# Patient Record
Sex: MALE | Race: WHITE | NOT HISPANIC OR LATINO | URBAN - METROPOLITAN AREA
[De-identification: names, ages, dates, MRNs, and addresses within clinical notes are randomized per-mention and may not be internally consistent; named-entity substitution may affect disease eponyms.]

---

## 2017-01-01 ENCOUNTER — EMERGENCY (EMERGENCY)
Facility: HOSPITAL | Age: 27
LOS: 1 days | Discharge: HOME | End: 2017-01-01
Attending: EMERGENCY MEDICINE | Admitting: EMERGENCY MEDICINE
Payer: COMMERCIAL

## 2017-01-01 VITALS
TEMPERATURE: 98 F | RESPIRATION RATE: 18 BRPM | DIASTOLIC BLOOD PRESSURE: 96 MMHG | SYSTOLIC BLOOD PRESSURE: 151 MMHG | OXYGEN SATURATION: 100 % | HEART RATE: 82 BPM

## 2017-01-01 LAB
ALBUMIN SERPL ELPH-MCNC: 4.9 G/DL — SIGNIFICANT CHANGE UP (ref 3.3–5)
ALP SERPL-CCNC: 49 U/L — SIGNIFICANT CHANGE UP (ref 40–120)
ALT FLD-CCNC: 17 U/L — SIGNIFICANT CHANGE UP (ref 4–41)
AST SERPL-CCNC: 24 U/L — SIGNIFICANT CHANGE UP (ref 4–40)
BASOPHILS # BLD AUTO: 0.04 K/UL — SIGNIFICANT CHANGE UP (ref 0–0.2)
BASOPHILS NFR BLD AUTO: 0.5 % — SIGNIFICANT CHANGE UP (ref 0–2)
BILIRUB SERPL-MCNC: 0.6 MG/DL — SIGNIFICANT CHANGE UP (ref 0.2–1.2)
BUN SERPL-MCNC: 8 MG/DL — SIGNIFICANT CHANGE UP (ref 7–23)
CALCIUM SERPL-MCNC: 9.6 MG/DL — SIGNIFICANT CHANGE UP (ref 8.4–10.5)
CHLORIDE SERPL-SCNC: 103 MMOL/L — SIGNIFICANT CHANGE UP (ref 98–107)
CO2 SERPL-SCNC: 24 MMOL/L — SIGNIFICANT CHANGE UP (ref 22–31)
CREAT SERPL-MCNC: 0.99 MG/DL — SIGNIFICANT CHANGE UP (ref 0.5–1.3)
CRP SERPL-MCNC: 0.3 MG/L — SIGNIFICANT CHANGE UP (ref 0.3–5)
EOSINOPHIL # BLD AUTO: 0.26 K/UL — SIGNIFICANT CHANGE UP (ref 0–0.5)
EOSINOPHIL NFR BLD AUTO: 3.3 % — SIGNIFICANT CHANGE UP (ref 0–6)
ERYTHROCYTE [SEDIMENTATION RATE] IN BLOOD: 2 MM/HR — SIGNIFICANT CHANGE UP (ref 1–15)
GLUCOSE SERPL-MCNC: 101 MG/DL — HIGH (ref 70–99)
HCT VFR BLD CALC: 45.3 % — SIGNIFICANT CHANGE UP (ref 39–50)
HGB BLD-MCNC: 15.5 G/DL — SIGNIFICANT CHANGE UP (ref 13–17)
IMM GRANULOCYTES NFR BLD AUTO: 0.3 % — SIGNIFICANT CHANGE UP (ref 0–1.5)
INR BLD: 1.02 — SIGNIFICANT CHANGE UP (ref 0.87–1.18)
LYMPHOCYTES # BLD AUTO: 1.49 K/UL — SIGNIFICANT CHANGE UP (ref 1–3.3)
LYMPHOCYTES # BLD AUTO: 19 % — SIGNIFICANT CHANGE UP (ref 13–44)
MCHC RBC-ENTMCNC: 32.2 PG — SIGNIFICANT CHANGE UP (ref 27–34)
MCHC RBC-ENTMCNC: 34.2 % — SIGNIFICANT CHANGE UP (ref 32–36)
MCV RBC AUTO: 94.2 FL — SIGNIFICANT CHANGE UP (ref 80–100)
MONOCYTES # BLD AUTO: 0.39 K/UL — SIGNIFICANT CHANGE UP (ref 0–0.9)
MONOCYTES NFR BLD AUTO: 5 % — SIGNIFICANT CHANGE UP (ref 2–14)
NEUTROPHILS # BLD AUTO: 5.66 K/UL — SIGNIFICANT CHANGE UP (ref 1.8–7.4)
NEUTROPHILS NFR BLD AUTO: 71.9 % — SIGNIFICANT CHANGE UP (ref 43–77)
PLATELET # BLD AUTO: 153 K/UL — SIGNIFICANT CHANGE UP (ref 150–400)
PMV BLD: 9.3 FL — SIGNIFICANT CHANGE UP (ref 7–13)
POTASSIUM SERPL-MCNC: 4.2 MMOL/L — SIGNIFICANT CHANGE UP (ref 3.5–5.3)
POTASSIUM SERPL-SCNC: 4.2 MMOL/L — SIGNIFICANT CHANGE UP (ref 3.5–5.3)
PROT SERPL-MCNC: 7.4 G/DL — SIGNIFICANT CHANGE UP (ref 6–8.3)
PROTHROM AB SERPL-ACNC: 11.6 SEC — SIGNIFICANT CHANGE UP (ref 10–13.1)
RBC # BLD: 4.81 M/UL — SIGNIFICANT CHANGE UP (ref 4.2–5.8)
RBC # FLD: 11.7 % — SIGNIFICANT CHANGE UP (ref 10.3–14.5)
SODIUM SERPL-SCNC: 144 MMOL/L — SIGNIFICANT CHANGE UP (ref 135–145)
WBC # BLD: 7.86 K/UL — SIGNIFICANT CHANGE UP (ref 3.8–10.5)
WBC # FLD AUTO: 7.86 K/UL — SIGNIFICANT CHANGE UP (ref 3.8–10.5)

## 2017-01-01 PROCEDURE — 72142 MRI NECK SPINE W/DYE: CPT | Mod: 26

## 2017-01-01 PROCEDURE — 99219: CPT

## 2017-01-01 RX ORDER — KETOROLAC TROMETHAMINE 30 MG/ML
30 SYRINGE (ML) INJECTION ONCE
Qty: 0 | Refills: 0 | Status: DISCONTINUED | OUTPATIENT
Start: 2017-01-01 | End: 2017-01-01

## 2017-01-01 RX ORDER — KETOROLAC TROMETHAMINE 30 MG/ML
30 SYRINGE (ML) INJECTION EVERY 6 HOURS
Qty: 0 | Refills: 0 | Status: COMPLETED | OUTPATIENT
Start: 2017-01-01 | End: 2017-01-02

## 2017-01-01 RX ADMIN — Medication 30 MILLIGRAM(S): at 17:00

## 2017-01-01 RX ADMIN — Medication 30 MILLIGRAM(S): at 16:40

## 2017-01-01 NOTE — ED ADULT TRIAGE NOTE - CHIEF COMPLAINT QUOTE
States he has had neck pain for 2 years. Had MRI done in 2014 and showed "slight disc herniation". Now co headaches and neck pain x 2 weeks. Co light sensitivity. + nuchal rigidity. Denies fevers. Mask applied in triage.

## 2017-01-01 NOTE — ED PROVIDER NOTE - PMH
Allergic Asthma    Cyclical vomiting syndrome    Insomnia    Intractable nausea and vomiting    Marijuana smoker    Neck pain

## 2017-01-01 NOTE — ED CDU PROVIDER NOTE - PLAN OF CARE
Take medications as prescribed. Follow with Spine Surgery. Return if neurologic motor symptoms such as weakness in arm or leg, imbalance/uncoordination, or incontinence, or if pain intolerable despite oral medications.

## 2017-01-01 NOTE — ED CDU PROVIDER NOTE - CONSTITUTIONAL, MLM
normal... Well appearing, well nourished, awake, alert, oriented to person, place, time/situation and in no apparent distress.  Pt. is verbalizing in full, clear sentences.

## 2017-01-01 NOTE — ED PROVIDER NOTE - OBJECTIVE STATEMENT
26y M with PMH of chronic neck pain presents to ED for neck pain x 2 year. Pt had MRI in 2014 showing mild disc herniation to C4. Today had headache and neck pain described as stabbing. Now notes migraines and trouble sleeping.  No trauma. Pt was weightlifter and played football. No fever nor change in mental status. Went to Spine surgeon, who referred to Neurology. Neurologist rx CT scan, but insurance denies. Pt now notes he is holds his neck differently and is cracking neck. Now notes RT shoulder pain. Earlier this year had 2 wks of hemoptysis, CXR negative, had pulmonologist, pt wnl. PMD rx muscle relaxants to no relief. Marijuana to no relief.

## 2017-01-01 NOTE — ED PROVIDER NOTE - NS ED MD SCRIBE ATTENDING SCRIBE SECTIONS
REVIEW OF SYSTEMS/DISPOSITION/HISTORY OF PRESENT ILLNESS/VITAL SIGNS( Pullset)/PAST MEDICAL/SURGICAL/SOCIAL HISTORY/PHYSICAL EXAM

## 2017-01-01 NOTE — ED PROVIDER NOTE - ATTENDING CONTRIBUTION TO CARE
I performed the initial face to face bedside interview with this patient regarding history of present illness, review of symptoms and past medical, social and family history.  I completed an independent physical examination.  I was the initial provider who evaluated this patient.  The history, review of symptoms and examination was documented by the scribe in my presence and I attest to the accuracy of the documentation.  I have signed out the follow up of any pending tests (i.e. labs, radiological studies) to the PA.  I have discussed the patient’s plan of care and disposition with the PA.

## 2017-01-01 NOTE — ED CDU PROVIDER NOTE - OBJECTIVE STATEMENT
26y M with PMH of chronic neck pain presents to ED for neck pain x 2 year. Pt had MRI in 2014 showing mild disc herniation to C4. Today had headache and neck pain described as stabbing. Now notes migraines and trouble sleeping.  No trauma. Pt was weightlifter and played football. No fever nor change in mental status. Went to Spine surgeon, who referred to Neurology. Neurologist rx CT scan, but insurance denies. Pt now notes he is holds his neck differently and is cracking neck. Now notes RT shoulder pain. Earlier this year had 2 wks of hemoptysis, CXR negative, had pulmonologist, pt wnl. PMD rx muscle relaxants to no relief. Marijuana to no relief.    CDU PAVEL Smith Note-----  ED Provider Note reviewed per above; pt is a 27 yo male with PMH of chronic neck pain, marijuana use, and past hx/o cyclic vomiting syndrome, who presented to the ED as noted above.  Pt. was evaluated in the ED and sent to CDU for MRI per orders, analgesia prn, observation and reassessment.  On CDU arrival, pt was still having neck pain; Percocet given.  Pt. c/o intermittent morning bilateral arm numbness that has been chronically occurring; states usually feels these symptoms when he first awakens.  Pt. has been to an orthopedic spine surgeon who gave very brief evaluation per pt and referred pt to neurologist (pt saw neurologist, was referred for CT scan, but pt states he had issues with his insurance approving this test).  Pt. states he has an upcoming appointment with a rheumatologist in another 2 weeks as well.  No fevers / new pain/discomfort.  No reports of focal weakness.  CDU plan discussed with pt who verbalizes agreement with plan.

## 2017-01-01 NOTE — ED CDU PROVIDER NOTE - MUSCULOSKELETAL MINIMAL EXAM
normal range of motion/neck supple/No muscle spasm or crepitus or soft tissue mass palpated.  No objectively reproducible TTP of neck posteriorly or upper back.  Strength in UE is 5/5 both proximally and distally; +NVI.  UE symmetrical and otherwise WNL bilaterally.  No limited ROM./motor intact/atraumatic

## 2017-01-01 NOTE — ED CDU PROVIDER NOTE - PROGRESS NOTE DETAILS
CDU PAVEL Smith Addendum----  Pt. resting comfortably in interim; no issues to date.  MRI is resulted; shows degenerative changes but no acute pathology.  Test results explained in detail (along with lab results); likely for d/c this am.  Pt. had stated he was previously on "muscle relaxants" but he was taking higher doses than prescribed with no relief, so pt stopped taking.  Pt. states percocet given helped his pain and brought pain to a 5/10 "tolerable" level; pt denies needing any additional analgesia at this time.  Pt. stable; will continue to monitor / reassess. CDU PA Luis Addendum----  Pt. resting comfortably in interim; no issues to date; will continue to monitor / reassess.  Pt. will be signed out to CDU day PA and attending at 0700 hrs. Merlene (Physician) CDU ATTENDING D/C NOTE: 26 M, PMH chronic neck pain (MRI 2014 w/ broad disc herniation to C4), not traumatic, p/w worsening headache and neck pain. Notes migraines and trouble sleeping. No fever nor change in mental status. Went to Spine surgeon, who referred to Neurology. Neurologist, who asked for a cervical CT scan, but insurance denied. Pt now notes he is holds his neck differently and is cracking is neck. Now notes RT shoulder pain. PMD rx muscle relaxants to no relief. Marijuana no relief. Earlier this year, had hemoptysis. PE notable for AFVSS, NAD, appears in mild pain. No point tenderness along C-spine. No motor deficits. Small, mobile L neck anterior chain LN. Patient admitted to CDU for MRI to eval for cervical osteomyelitis. MRI confirmed disc herniations, no e/o osteo or cord compression. Pain improved with Percocet. Discharge home w/ referral to Spine Surgery and Percocet. Merlene: PA attestation for 1/2/17. I performed a face-to-face evaluation of the patient and performed a history and physical examination. I agree with the history and physical examination.

## 2017-01-01 NOTE — ED PROVIDER NOTE - PROGRESS NOTE DETAILS
PAVEL Ryan- spoke with MRI tech, unable to do MRI tonight- closes at 11, 1 pediatric emergency and 2 other MRI's scheduled. Pt still in pain.  Will dispo for CDU for continued pain management and MRI c-spine in the am.

## 2017-01-01 NOTE — ED CDU PROVIDER NOTE - ATTENDING CONTRIBUTION TO CARE
I performed a face-to-face evaluation of the patient and performed a history and physical examination. I agree with the history and physical examination.    26 M, PMH chronic neck pain (MRI 2014 w/ broad disc herniation to C4), not traumatic, p/w worsening headache and neck pain. Notes migraines and trouble sleeping. No fever nor change in mental status. Went to Spine surgeon, who referred to Neurology. Neurologist, who asked for a cervical CT scan, but insurance denied. Pt now notes he is holds his neck differently and is cracking is neck. Now notes RT shoulder pain. PMD rx muscle relaxants to no relief. Marijuana no relief. Earlier this year, had hemoptysis. PE notable for AFVSS, NAD, appears in mild pain. No point tenderness along C-spine. No motor deficits. Small, mobile L neck anterior chain LN. Patient admitted to CDU for MRI to eval for cervical osteomyelitis.

## 2017-01-02 VITALS
TEMPERATURE: 98 F | RESPIRATION RATE: 18 BRPM | SYSTOLIC BLOOD PRESSURE: 119 MMHG | DIASTOLIC BLOOD PRESSURE: 67 MMHG | OXYGEN SATURATION: 100 % | HEART RATE: 52 BPM

## 2017-01-02 LAB — HBA1C BLD-MCNC: 5.5 % — SIGNIFICANT CHANGE UP (ref 4–5.6)

## 2017-01-02 PROCEDURE — 99217: CPT

## 2017-01-02 RX ORDER — OXYCODONE AND ACETAMINOPHEN 5; 325 MG/1; MG/1
1 TABLET ORAL
Qty: 16 | Refills: 0
Start: 2017-01-02 | End: 2017-01-06

## 2017-01-02 RX ADMIN — Medication 30 MILLIGRAM(S): at 01:17

## 2017-01-02 RX ADMIN — Medication 30 MILLIGRAM(S): at 01:30

## 2017-01-02 RX ADMIN — Medication 30 MILLIGRAM(S): at 07:15

## 2017-01-09 ENCOUNTER — APPOINTMENT (OUTPATIENT)
Dept: PHYSICAL MEDICINE AND REHAB | Facility: CLINIC | Age: 27
End: 2017-01-09

## 2017-01-09 VITALS
SYSTOLIC BLOOD PRESSURE: 128 MMHG | BODY MASS INDEX: 20.32 KG/M2 | WEIGHT: 150 LBS | DIASTOLIC BLOOD PRESSURE: 79 MMHG | HEIGHT: 72 IN | OXYGEN SATURATION: 97 % | HEART RATE: 77 BPM

## 2017-01-09 DIAGNOSIS — M54.12 RADICULOPATHY, CERVICAL REGION: ICD-10-CM

## 2017-01-09 DIAGNOSIS — M54.2 CERVICALGIA: ICD-10-CM

## 2017-01-09 DIAGNOSIS — R51 HEADACHE: ICD-10-CM

## 2017-01-09 RX ORDER — NAPROXEN 500 MG/1
TABLET ORAL
Refills: 0 | Status: ACTIVE | COMMUNITY

## 2017-01-09 RX ORDER — OXYCODONE HYDROCHLORIDE AND ACETAMINOPHEN 5; 325 MG/1; MG/1
5-325 TABLET ORAL
Refills: 0 | Status: ACTIVE | COMMUNITY

## 2017-01-23 RX ORDER — OXYCODONE AND ACETAMINOPHEN 5; 325 MG/1; MG/1
5-325 TABLET ORAL
Qty: 60 | Refills: 0 | Status: ACTIVE | COMMUNITY
Start: 2017-01-23 | End: 1900-01-01

## 2018-02-13 ENCOUNTER — TRANSCRIPTION ENCOUNTER (OUTPATIENT)
Age: 28
End: 2018-02-13

## 2019-01-11 ENCOUNTER — TRANSCRIPTION ENCOUNTER (OUTPATIENT)
Age: 29
End: 2019-01-11

## 2019-03-30 ENCOUNTER — TRANSCRIPTION ENCOUNTER (OUTPATIENT)
Age: 29
End: 2019-03-30

## 2021-06-30 NOTE — ED CDU PROVIDER NOTE - HEAD, MLM
MD notified of rhythm change. EKG preformed to verify pt in afib with bundle branch block. Will continue to monitor. Head is atraumatic. Head shape is symmetrical.
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
## 2024-02-21 ENCOUNTER — INPATIENT (INPATIENT)
Facility: HOSPITAL | Age: 34
LOS: 1 days | Discharge: HOME | DRG: 392 | End: 2024-02-23
Attending: INTERNAL MEDICINE | Admitting: INTERNAL MEDICINE
Payer: MEDICAID

## 2024-02-21 VITALS
RESPIRATION RATE: 16 BRPM | HEART RATE: 87 BPM | WEIGHT: 149.91 LBS | TEMPERATURE: 98 F | HEIGHT: 71 IN | DIASTOLIC BLOOD PRESSURE: 90 MMHG | SYSTOLIC BLOOD PRESSURE: 155 MMHG | OXYGEN SATURATION: 98 %

## 2024-02-21 LAB
ALBUMIN SERPL ELPH-MCNC: 5.4 G/DL — HIGH (ref 3.3–5)
ALP SERPL-CCNC: 64 U/L — SIGNIFICANT CHANGE UP (ref 40–120)
ALT FLD-CCNC: 26 U/L — SIGNIFICANT CHANGE UP (ref 12–78)
ANION GAP SERPL CALC-SCNC: 7 MMOL/L — SIGNIFICANT CHANGE UP (ref 5–17)
APPEARANCE UR: CLEAR — SIGNIFICANT CHANGE UP
AST SERPL-CCNC: 26 U/L — SIGNIFICANT CHANGE UP (ref 15–37)
BASOPHILS # BLD AUTO: 0.02 K/UL — SIGNIFICANT CHANGE UP (ref 0–0.2)
BASOPHILS NFR BLD AUTO: 0.1 % — SIGNIFICANT CHANGE UP (ref 0–2)
BILIRUB SERPL-MCNC: 0.9 MG/DL — SIGNIFICANT CHANGE UP (ref 0.2–1.2)
BILIRUB UR-MCNC: NEGATIVE — SIGNIFICANT CHANGE UP
BUN SERPL-MCNC: 11 MG/DL — SIGNIFICANT CHANGE UP (ref 7–23)
CALCIUM SERPL-MCNC: 10.8 MG/DL — HIGH (ref 8.5–10.1)
CHLORIDE SERPL-SCNC: 113 MMOL/L — HIGH (ref 96–108)
CO2 SERPL-SCNC: 25 MMOL/L — SIGNIFICANT CHANGE UP (ref 22–31)
COLOR SPEC: YELLOW — SIGNIFICANT CHANGE UP
CREAT SERPL-MCNC: 1.2 MG/DL — SIGNIFICANT CHANGE UP (ref 0.5–1.3)
DIFF PNL FLD: ABNORMAL
EGFR: 83 ML/MIN/1.73M2 — SIGNIFICANT CHANGE UP
EOSINOPHIL # BLD AUTO: 0 K/UL — SIGNIFICANT CHANGE UP (ref 0–0.5)
EOSINOPHIL NFR BLD AUTO: 0 % — SIGNIFICANT CHANGE UP (ref 0–6)
EPI CELLS # UR: PRESENT
GLUCOSE SERPL-MCNC: 139 MG/DL — HIGH (ref 70–99)
GLUCOSE UR QL: 100 MG/DL
HCT VFR BLD CALC: 48 % — SIGNIFICANT CHANGE UP (ref 39–50)
HGB BLD-MCNC: 17.1 G/DL — HIGH (ref 13–17)
IMM GRANULOCYTES NFR BLD AUTO: 0.3 % — SIGNIFICANT CHANGE UP (ref 0–0.9)
KETONES UR-MCNC: 15 MG/DL
LEUKOCYTE ESTERASE UR-ACNC: NEGATIVE — SIGNIFICANT CHANGE UP
LIDOCAIN IGE QN: 18 U/L — SIGNIFICANT CHANGE UP (ref 13–75)
LYMPHOCYTES # BLD AUTO: 0.57 K/UL — LOW (ref 1–3.3)
LYMPHOCYTES # BLD AUTO: 4.2 % — LOW (ref 13–44)
MCHC RBC-ENTMCNC: 32.4 PG — SIGNIFICANT CHANGE UP (ref 27–34)
MCHC RBC-ENTMCNC: 35.6 GM/DL — SIGNIFICANT CHANGE UP (ref 32–36)
MCV RBC AUTO: 91.1 FL — SIGNIFICANT CHANGE UP (ref 80–100)
MONOCYTES # BLD AUTO: 0.34 K/UL — SIGNIFICANT CHANGE UP (ref 0–0.9)
MONOCYTES NFR BLD AUTO: 2.5 % — SIGNIFICANT CHANGE UP (ref 2–14)
NEUTROPHILS # BLD AUTO: 12.67 K/UL — HIGH (ref 1.8–7.4)
NEUTROPHILS NFR BLD AUTO: 92.9 % — HIGH (ref 43–77)
NITRITE UR-MCNC: NEGATIVE — SIGNIFICANT CHANGE UP
NRBC # BLD: 0 /100 WBCS — SIGNIFICANT CHANGE UP (ref 0–0)
PH UR: 8.5 (ref 5–8)
PLATELET # BLD AUTO: 268 K/UL — SIGNIFICANT CHANGE UP (ref 150–400)
POTASSIUM SERPL-MCNC: 4.6 MMOL/L — SIGNIFICANT CHANGE UP (ref 3.5–5.3)
POTASSIUM SERPL-SCNC: 4.6 MMOL/L — SIGNIFICANT CHANGE UP (ref 3.5–5.3)
PROT SERPL-MCNC: 9.4 G/DL — HIGH (ref 6–8.3)
PROT UR-MCNC: 100 MG/DL
RBC # BLD: 5.27 M/UL — SIGNIFICANT CHANGE UP (ref 4.2–5.8)
RBC # FLD: 11.8 % — SIGNIFICANT CHANGE UP (ref 10.3–14.5)
RBC CASTS # UR COMP ASSIST: 5 /HPF — HIGH (ref 0–4)
SODIUM SERPL-SCNC: 145 MMOL/L — SIGNIFICANT CHANGE UP (ref 135–145)
SP GR SPEC: 1.09 — HIGH (ref 1–1.03)
UROBILINOGEN FLD QL: 0.2 MG/DL — SIGNIFICANT CHANGE UP (ref 0.2–1)
WBC # BLD: 13.64 K/UL — HIGH (ref 3.8–10.5)
WBC # FLD AUTO: 13.64 K/UL — HIGH (ref 3.8–10.5)
WBC UR QL: 0 /HPF — SIGNIFICANT CHANGE UP (ref 0–5)

## 2024-02-21 PROCEDURE — 99285 EMERGENCY DEPT VISIT HI MDM: CPT

## 2024-02-21 PROCEDURE — 74177 CT ABD & PELVIS W/CONTRAST: CPT | Mod: 26,MA

## 2024-02-21 RX ORDER — SODIUM CHLORIDE 9 MG/ML
1000 INJECTION INTRAMUSCULAR; INTRAVENOUS; SUBCUTANEOUS ONCE
Refills: 0 | Status: COMPLETED | OUTPATIENT
Start: 2024-02-21 | End: 2024-02-21

## 2024-02-21 RX ORDER — KETOROLAC TROMETHAMINE 30 MG/ML
30 SYRINGE (ML) INJECTION ONCE
Refills: 0 | Status: DISCONTINUED | OUTPATIENT
Start: 2024-02-21 | End: 2024-02-21

## 2024-02-21 RX ORDER — ONDANSETRON 8 MG/1
1 TABLET, FILM COATED ORAL
Qty: 2 | Refills: 0
Start: 2024-02-21 | End: 2024-02-24

## 2024-02-21 RX ORDER — METOCLOPRAMIDE HCL 10 MG
10 TABLET ORAL ONCE
Refills: 0 | Status: COMPLETED | OUTPATIENT
Start: 2024-02-21 | End: 2024-02-21

## 2024-02-21 RX ORDER — ACETAMINOPHEN 500 MG
1000 TABLET ORAL ONCE
Refills: 0 | Status: COMPLETED | OUTPATIENT
Start: 2024-02-21 | End: 2024-02-21

## 2024-02-21 RX ORDER — ONDANSETRON 8 MG/1
4 TABLET, FILM COATED ORAL ONCE
Refills: 0 | Status: COMPLETED | OUTPATIENT
Start: 2024-02-21 | End: 2024-02-21

## 2024-02-21 RX ORDER — HALOPERIDOL DECANOATE 100 MG/ML
2.5 INJECTION INTRAMUSCULAR ONCE
Refills: 0 | Status: COMPLETED | OUTPATIENT
Start: 2024-02-21 | End: 2024-02-21

## 2024-02-21 RX ADMIN — Medication 30 MILLIGRAM(S): at 20:48

## 2024-02-21 RX ADMIN — Medication 400 MILLIGRAM(S): at 23:16

## 2024-02-21 RX ADMIN — HALOPERIDOL DECANOATE 2.5 MILLIGRAM(S): 100 INJECTION INTRAMUSCULAR at 23:16

## 2024-02-21 RX ADMIN — SODIUM CHLORIDE 1000 MILLILITER(S): 9 INJECTION INTRAMUSCULAR; INTRAVENOUS; SUBCUTANEOUS at 20:14

## 2024-02-21 RX ADMIN — Medication 30 MILLIGRAM(S): at 21:30

## 2024-02-21 RX ADMIN — SODIUM CHLORIDE 1000 MILLILITER(S): 9 INJECTION INTRAMUSCULAR; INTRAVENOUS; SUBCUTANEOUS at 20:49

## 2024-02-21 RX ADMIN — ONDANSETRON 4 MILLIGRAM(S): 8 TABLET, FILM COATED ORAL at 23:16

## 2024-02-21 RX ADMIN — Medication 10 MILLIGRAM(S): at 20:34

## 2024-02-21 NOTE — ED PROVIDER NOTE - ATTENDING APP SHARED VISIT CONTRIBUTION OF CARE
34 y/o M with PMH of IBS presenting with nausea/vomiting and lower abdominal pain   Reports similar symptoms in Dec  Living in [place] intermittently. No fevers/chills.  No blood in stool   Benign abdominal exam. CT without acute findings  Possibly related to IBS/Acute Gastroenteritis  Will treat with IVF, antiemetics, needs close GI follow up  Informed of incidentally elevated calcium and follow up with PCP for further evaluation if remains elevated. 34 y/o M with PMH of IBS presenting with nausea/vomiting and lower abdominal pain   Reports similar symptoms in Dec  Living in [place] intermittently. No fevers/chills.  No blood in stool   Benign abdominal exam. CT without acute findings  Possibly related to IBS/Acute Gastroenteritis  Will treat with IVF, antiemetics, and reassess  Informed of incidentally elevated calcium and follow up with PCP for further evaluation if remains elevated. Notably hemoconcentration---needs IVF replacement.

## 2024-02-21 NOTE — ED ADULT NURSE REASSESSMENT NOTE - NS ED NURSE REASSESS COMMENT FT1
Pt attempted PO challenge prior to Zofran admin. Vomited apple juice shortly after ingesting. No blood noted. Pt feeling weak and tired w/ increased pain. Orders placed for Haldol, Ofirmev. Meds given, fluids still running. Pt now resting comfortably. Safety maintained, monitoring ongoing.

## 2024-02-21 NOTE — ED PROVIDER NOTE - NSICDXPASTMEDICALHX_GEN_ALL_CORE_FT
PAST MEDICAL HISTORY:  Allergic Asthma     Cyclical vomiting syndrome     Insomnia     Intractable nausea and vomiting     Marijuana smoker     Neck pain

## 2024-02-21 NOTE — ED PROVIDER NOTE - OBJECTIVE STATEMENT
33-year-old male with past medical history of IBS presents with abdominal pain today.  Patient reports diffuse lower abdominal pain with nausea and vomiting.  Patient reports he had symptoms in the past back in December had a CAT scan at [facility] without acute findings.  Patient reports he recently moved here so does not have a gastroenterologist.  Patient did not take any for symptoms.  Patient denies fever chest pain shortness of breath history abdominal surgery.

## 2024-02-21 NOTE — ED ADULT NURSE NOTE - OBJECTIVE STATEMENT
33 M arrived to ED c/o severe LLQ abd pain started a couple of days ago. Pt has hx of episodes like this and has been treated but undiagnosed pathology. States pain 9/10 currently. Tenderness to palpation. Reports soft thin stools which turn to watery diarrhea, accompanied by N/V/chills. Denies CP/SOB. Unknown fevers. Denies urinary S&S. Labs sent, fluids running. Resting at this time. Awaiting further orders.

## 2024-02-22 DIAGNOSIS — R11.15 CYCLICAL VOMITING SYNDROME UNRELATED TO MIGRAINE: ICD-10-CM

## 2024-02-22 DIAGNOSIS — G47.00 INSOMNIA, UNSPECIFIED: ICD-10-CM

## 2024-02-22 DIAGNOSIS — F12.90 CANNABIS USE, UNSPECIFIED, UNCOMPLICATED: ICD-10-CM

## 2024-02-22 DIAGNOSIS — M54.2 CERVICALGIA: ICD-10-CM

## 2024-02-22 DIAGNOSIS — D72.829 ELEVATED WHITE BLOOD CELL COUNT, UNSPECIFIED: ICD-10-CM

## 2024-02-22 DIAGNOSIS — F41.9 ANXIETY DISORDER, UNSPECIFIED: ICD-10-CM

## 2024-02-22 DIAGNOSIS — R11.2 NAUSEA WITH VOMITING, UNSPECIFIED: ICD-10-CM

## 2024-02-22 DIAGNOSIS — K58.9 IRRITABLE BOWEL SYNDROME WITHOUT DIARRHEA: ICD-10-CM

## 2024-02-22 DIAGNOSIS — Z29.9 ENCOUNTER FOR PROPHYLACTIC MEASURES, UNSPECIFIED: ICD-10-CM

## 2024-02-22 DIAGNOSIS — K52.9 NONINFECTIVE GASTROENTERITIS AND COLITIS, UNSPECIFIED: ICD-10-CM

## 2024-02-22 DIAGNOSIS — Z20.828 CONTACT WITH AND (SUSPECTED) EXPOSURE TO OTHER VIRAL COMMUNICABLE DISEASES: ICD-10-CM

## 2024-02-22 DIAGNOSIS — J45.909 UNSPECIFIED ASTHMA, UNCOMPLICATED: ICD-10-CM

## 2024-02-22 LAB
ALBUMIN SERPL ELPH-MCNC: 4.1 G/DL — SIGNIFICANT CHANGE UP (ref 3.3–5)
ALP SERPL-CCNC: 48 U/L — SIGNIFICANT CHANGE UP (ref 40–120)
ALT FLD-CCNC: 20 U/L — SIGNIFICANT CHANGE UP (ref 12–78)
ANION GAP SERPL CALC-SCNC: 4 MMOL/L — LOW (ref 5–17)
AST SERPL-CCNC: 25 U/L — SIGNIFICANT CHANGE UP (ref 15–37)
BILIRUB SERPL-MCNC: 0.7 MG/DL — SIGNIFICANT CHANGE UP (ref 0.2–1.2)
BUN SERPL-MCNC: 10 MG/DL — SIGNIFICANT CHANGE UP (ref 7–23)
CALCIUM SERPL-MCNC: 8.8 MG/DL — SIGNIFICANT CHANGE UP (ref 8.5–10.1)
CHLORIDE SERPL-SCNC: 115 MMOL/L — HIGH (ref 96–108)
CHOLEST SERPL-MCNC: 146 MG/DL — SIGNIFICANT CHANGE UP
CO2 SERPL-SCNC: 26 MMOL/L — SIGNIFICANT CHANGE UP (ref 22–31)
CREAT SERPL-MCNC: 0.92 MG/DL — SIGNIFICANT CHANGE UP (ref 0.5–1.3)
EGFR: 114 ML/MIN/1.73M2 — SIGNIFICANT CHANGE UP
FLUAV AG NPH QL: SIGNIFICANT CHANGE UP
FLUBV AG NPH QL: SIGNIFICANT CHANGE UP
GLUCOSE SERPL-MCNC: 112 MG/DL — HIGH (ref 70–99)
HCT VFR BLD CALC: 40.4 % — SIGNIFICANT CHANGE UP (ref 39–50)
HDLC SERPL-MCNC: 49 MG/DL — SIGNIFICANT CHANGE UP
HGB BLD-MCNC: 14.1 G/DL — SIGNIFICANT CHANGE UP (ref 13–17)
LACTATE SERPL-SCNC: 1.1 MMOL/L — SIGNIFICANT CHANGE UP (ref 0.7–2)
LIPID PNL WITH DIRECT LDL SERPL: 86 MG/DL — SIGNIFICANT CHANGE UP
MAGNESIUM SERPL-MCNC: 1.9 MG/DL — SIGNIFICANT CHANGE UP (ref 1.6–2.6)
MAGNESIUM SERPL-MCNC: 1.9 MG/DL — SIGNIFICANT CHANGE UP (ref 1.6–2.6)
MCHC RBC-ENTMCNC: 32.1 PG — SIGNIFICANT CHANGE UP (ref 27–34)
MCHC RBC-ENTMCNC: 34.9 GM/DL — SIGNIFICANT CHANGE UP (ref 32–36)
MCV RBC AUTO: 92 FL — SIGNIFICANT CHANGE UP (ref 80–100)
NON HDL CHOLESTEROL: 97 MG/DL — SIGNIFICANT CHANGE UP
NRBC # BLD: 0 /100 WBCS — SIGNIFICANT CHANGE UP (ref 0–0)
PHOSPHATE SERPL-MCNC: 2.6 MG/DL — SIGNIFICANT CHANGE UP (ref 2.5–4.5)
PLATELET # BLD AUTO: 190 K/UL — SIGNIFICANT CHANGE UP (ref 150–400)
POTASSIUM SERPL-MCNC: 4 MMOL/L — SIGNIFICANT CHANGE UP (ref 3.5–5.3)
POTASSIUM SERPL-SCNC: 4 MMOL/L — SIGNIFICANT CHANGE UP (ref 3.5–5.3)
PROT SERPL-MCNC: 7.1 G/DL — SIGNIFICANT CHANGE UP (ref 6–8.3)
RBC # BLD: 4.39 M/UL — SIGNIFICANT CHANGE UP (ref 4.2–5.8)
RBC # FLD: 11.9 % — SIGNIFICANT CHANGE UP (ref 10.3–14.5)
RSV RNA NPH QL NAA+NON-PROBE: SIGNIFICANT CHANGE UP
SARS-COV-2 RNA SPEC QL NAA+PROBE: SIGNIFICANT CHANGE UP
SODIUM SERPL-SCNC: 145 MMOL/L — SIGNIFICANT CHANGE UP (ref 135–145)
TRIGL SERPL-MCNC: 53 MG/DL — SIGNIFICANT CHANGE UP
TROPONIN I, HIGH SENSITIVITY RESULT: 6.7 NG/L — SIGNIFICANT CHANGE UP
TSH SERPL-MCNC: 0.57 UIU/ML — SIGNIFICANT CHANGE UP (ref 0.36–3.74)
WBC # BLD: 14.28 K/UL — HIGH (ref 3.8–10.5)
WBC # FLD AUTO: 14.28 K/UL — HIGH (ref 3.8–10.5)

## 2024-02-22 PROCEDURE — 93010 ELECTROCARDIOGRAM REPORT: CPT

## 2024-02-22 PROCEDURE — 99222 1ST HOSP IP/OBS MODERATE 55: CPT

## 2024-02-22 PROCEDURE — 71045 X-RAY EXAM CHEST 1 VIEW: CPT | Mod: 26

## 2024-02-22 RX ORDER — PANTOPRAZOLE SODIUM 20 MG/1
40 TABLET, DELAYED RELEASE ORAL ONCE
Refills: 0 | Status: COMPLETED | OUTPATIENT
Start: 2024-02-22 | End: 2024-02-22

## 2024-02-22 RX ORDER — SODIUM CHLORIDE 9 MG/ML
1000 INJECTION, SOLUTION INTRAVENOUS
Refills: 0 | Status: DISCONTINUED | OUTPATIENT
Start: 2024-02-22 | End: 2024-02-22

## 2024-02-22 RX ORDER — KETOROLAC TROMETHAMINE 30 MG/ML
15 SYRINGE (ML) INJECTION THREE TIMES A DAY
Refills: 0 | Status: DISCONTINUED | OUTPATIENT
Start: 2024-02-22 | End: 2024-02-23

## 2024-02-22 RX ORDER — LANOLIN ALCOHOL/MO/W.PET/CERES
5 CREAM (GRAM) TOPICAL AT BEDTIME
Refills: 0 | Status: DISCONTINUED | OUTPATIENT
Start: 2024-02-22 | End: 2024-02-23

## 2024-02-22 RX ORDER — ALPRAZOLAM 0.25 MG
0.5 TABLET ORAL ONCE
Refills: 0 | Status: DISCONTINUED | OUTPATIENT
Start: 2024-02-22 | End: 2024-02-22

## 2024-02-22 RX ORDER — SODIUM CHLORIDE 9 MG/ML
1000 INJECTION, SOLUTION INTRAVENOUS
Refills: 0 | Status: DISCONTINUED | OUTPATIENT
Start: 2024-02-22 | End: 2024-02-23

## 2024-02-22 RX ORDER — PANTOPRAZOLE SODIUM 20 MG/1
40 TABLET, DELAYED RELEASE ORAL DAILY
Refills: 0 | Status: DISCONTINUED | OUTPATIENT
Start: 2024-02-22 | End: 2024-02-23

## 2024-02-22 RX ORDER — ONDANSETRON 8 MG/1
4 TABLET, FILM COATED ORAL EVERY 6 HOURS
Refills: 0 | Status: DISCONTINUED | OUTPATIENT
Start: 2024-02-22 | End: 2024-02-23

## 2024-02-22 RX ORDER — KETOROLAC TROMETHAMINE 30 MG/ML
30 SYRINGE (ML) INJECTION THREE TIMES A DAY
Refills: 0 | Status: DISCONTINUED | OUTPATIENT
Start: 2024-02-22 | End: 2024-02-23

## 2024-02-22 RX ORDER — ACETAMINOPHEN 500 MG
650 TABLET ORAL EVERY 6 HOURS
Refills: 0 | Status: DISCONTINUED | OUTPATIENT
Start: 2024-02-22 | End: 2024-02-23

## 2024-02-22 RX ORDER — ALPRAZOLAM 0.25 MG
0.25 TABLET ORAL ONCE
Refills: 0 | Status: DISCONTINUED | OUTPATIENT
Start: 2024-02-22 | End: 2024-02-22

## 2024-02-22 RX ADMIN — SODIUM CHLORIDE 125 MILLILITER(S): 9 INJECTION, SOLUTION INTRAVENOUS at 05:28

## 2024-02-22 RX ADMIN — PANTOPRAZOLE SODIUM 40 MILLIGRAM(S): 20 TABLET, DELAYED RELEASE ORAL at 02:00

## 2024-02-22 RX ADMIN — Medication 1000 MILLIGRAM(S): at 00:00

## 2024-02-22 RX ADMIN — Medication 0.25 MILLIGRAM(S): at 22:18

## 2024-02-22 RX ADMIN — SODIUM CHLORIDE 125 MILLILITER(S): 9 INJECTION, SOLUTION INTRAVENOUS at 11:39

## 2024-02-22 RX ADMIN — Medication 200 MILLIGRAM(S): at 02:00

## 2024-02-22 RX ADMIN — Medication 0.5 MILLIGRAM(S): at 14:00

## 2024-02-22 NOTE — H&P ADULT - PROBLEM SELECTOR PLAN 11
Welcome To Medicare and Establish Care       HPI:  Aric Mccord is a 81y.o. year old male who is here to [facility]. He  had his medical care:    [facility]    He reports the following history and medical concerns:      DM- diagnosed 2010. Lab Results   Component Value Date/Time    Hemoglobin A1c 6.3 04/04/2017 56:94 AM     No complications. Gets eyes checked. Has appointment tomorrow. Metformin 2 tabs po bid and januvia 100 mg. Not taking pravastatin on own. Bilateral ankle pain. Bilateral ankle pronated- joint replacement. One at a time. Heather Carpenter at [address]. Assessment and Plan        1. Prostate cancer screening  Pt due for prostate test.  The natural history of prostate cancer and ongoing controversy regarding screening and potential treatment outcomes of prostate cancer has been discussed with the patient. The meaning of a false positive PSA and a false negative PSA has been discussed. He indicates understanding of the limitations of this screening test and wishes  to proceed with screening PSA testing. Patient aware of the possible long term complications of prostate treatment that include impotence, urine incontinence, and rectal bleeding. At a certain age, it may be advisable to stop checking as studies have shown that patients succumb to other causes of death before a prostate cancer can cause death. - PSA SCREENING (SCREENING)    2. Pure hypercholesterolemia  Has not been taking statin. Reconsider restarting pravachol 20 mg.  - AMB POC EKG ROUTINE W/ 12 LEADS, INTER & REP    3. Controlled type 2 diabetes mellitus without complication, without long-term current use of insulin (HCC)  Check carotid and AAA. Recheck F2I.    - METABOLIC PANEL, COMPREHENSIVE  - HEMOGLOBIN A1C WITH EAG  - MICROALBUMIN, UR, RAND W/ MICROALBUMIN/CREA RATIO  - UA/M W/RFLX CULTURE, ROUTINE  - DUPLEX CAROTID BILATERAL; Future        5. H/O blastomycosis  Pt wants CXR to follow up.   - XR CHEST PA LAT; Future    6. Screening for abdominal aortic aneurysm  Screening for medicare  - US EXAM SCREENING AAA; Future    7. Encounter for immunization  Immunization given. Discussed risks and benefits. Side effects. VIS given through visit summary via Mychart or paper copy if not on Mychart   - Influenza virus vaccine (FLUZONE HIGH-DOSE) 65 years and older  - DE IMMUNIZ ADMIN,1 SINGLE/COMB VAC/TOXOID    8. Welcome to Medicare preventive visit  Completed. Advanced directive given. 9. Screening for depression  Negative                Visit Vitals    /70 (BP 1 Location: Left arm, BP Patient Position: Sitting)    Pulse 70    Temp 98.5 °F (36.9 °C) (Oral)    Resp 18    Ht 5' 9\" (1.753 m)    Wt 191 lb (86.6 kg)    SpO2 96%    BMI 28.21 kg/m2       Historical Data    Past Medical History:   Diagnosis Date    Blastomycosis 1990    (treated with Sporanox) -Left Lung    Diabetes ([address]     Type 2    History of colonic polyps     Tinnitus        Past Surgical History:   Procedure Laterality Date    HX COLONOSCOPY  2005    several polyps    HX HEENT      tonsillectomy (age 13)   [facility]  48    L Plantar fascia trimmed       Outpatient Encounter Prescriptions as of 9/25/2017   Medication Sig Dispense Refill    omeprazole (PRILOSEC) 40 mg capsule Take 40 mg by mouth daily. 0    metFORMIN (GLUCOPHAGE) 500 mg tablet TAKE 2 TABS BY MOUTH TWO (2) TIMES DAILY (WITH MEALS). need to establish with new primary care provider for further refills 40 Tab 0    SITagliptin (JANUVIA) 100 mg tablet Take 1 Tab by mouth daily. Need to establish with new primary care provider for further refills 10 Tab 0    glucose blood VI test strips (ONETOUCH ULTRA TEST) strip Use to check BS BID. DM E11.9 200 Strip 1    pravastatin (PRAVACHOL) 20 mg tablet Take 1 Tab by mouth nightly. 90 Tab 1    multivitamin (ONE A DAY) tablet Take 1 Tab by mouth daily.        No facility-administered encounter medications on file as of 9/25/2017. Allergies   Allergen Reactions    Iodine Nausea Only     Shrimp        Social History     Social History    Marital status:      Spouse name: N/A    Number of children: N/A    Years of education: N/A     Occupational History    defense  [employer]     Social History Main Topics    Smoking status: Never Smoker    Smokeless tobacco: Never Used    Alcohol use 0.0 oz/week      Comment: rare to occasional    Drug use: No    Sexual activity: Yes     Partners: Female     Other Topics Concern    Not on file     Social History Narrative        family history includes Cancer in his father; Dementia in his mother; Diabetes in his father and mother; Psychiatric Disorder in his mother; Stroke in his father. Review of Systems   Constitutional: Negative for weight loss. Eyes: Negative for blurred vision. Respiratory: Negative for shortness of breath. Cardiovascular: Negative for chest pain. Gastrointestinal: Negative for abdominal pain. Genitourinary: Negative for dysuria and frequency. Skin: Negative for rash. Neurological: Negative for dizziness, focal weakness, weakness and headaches. Endo/Heme/Allergies: Negative for environmental allergies. Does not bruise/bleed easily. Physical Exam   Constitutional: He appears well-developed and well-nourished. He is active. Non-toxic appearance. He does not have a sickly appearance. He does not appear ill. No distress. Eyes: Conjunctivae are normal. Right eye exhibits no discharge. Neck: Carotid bruit is present. Cardiovascular: Normal rate, regular rhythm, S1 normal, S2 normal, normal heart sounds and normal pulses. Exam reveals no gallop and no friction rub. Pulmonary/Chest: Effort normal and breath sounds normal. No respiratory distress. Abdominal: Soft. Bowel sounds are normal.   Musculoskeletal: He exhibits no edema or deformity. Neurological: He is alert. Skin: Skin is warm and dry.  No rash noted. No pallor. Psychiatric: He has a normal mood and affect. His behavior is normal.   Vitals reviewed. Ortho Exam       Orders Placed This Encounter    omeprazole (PRILOSEC) 40 mg capsule     Sig: Take 40 mg by mouth daily. Refill:  0        I have reviewed the patient's medical history in detail and updated the computerized patient record. We had a prolonged discussion about these complex clinical issues and went over the various important aspects to consider. All questions were answered. Advised him to call back or return to office if symptoms do not improve, change in nature, or persist.    He was given an after visit summary or informed of [employer] Access which includes patient instructions, diagnoses, current medications, & vitals. He expressed understanding with the diagnosis and plan. Aric Mccord is a 81 y.o. male and presents for annual Medicare Wellness Visit. Assessment of cognitive impairment: Alert and oriented x 3. Patient denies concerns about cognitive impairment. Problem List: Reviewed with patient and discussed risk factors. Patient Active Problem List   Diagnosis Code    Diabetes mellitus type 2, controlled, without complications ([facility] [address] L64.8    H/O blastomycosis Z86.19    Hyperlipidemia with target LDL less than 100 E78.5    Bilateral leg and foot pain M79.604, M79.605, M79.671, P73.652       Current medical providers:  Patient Care Team:  Myron Ledesma MD as PCP - General (Internal Medicine)        End of Life Planning: This was discussed with him today and he no. Reviewed DNR/DNI and patient is no. Depression Screen: Reviewed PQH2 done by nurse. PHQ over the last two weeks 9/25/2017   Little interest or pleasure in doing things Not at all   Feeling down, depressed or hopeless Not at all   Total Score PHQ 2 0       Fall Risk:   Fall Risk Assessment, last 12 mths 9/25/2017   Able to walk? Yes   Fall in past 12 months?  No       Home Safety - discussion completed. No issues found. Hearing Loss: Only high frequencies    Activities of Daily Living:  Self-care. Requires assistance with: no ADLs    Adult Nutrition Screen:  No risk factors noted. Health Maintenance- Reviewed    AAA Screening:  Glaucoma Screening:       Health Maintenance Due   Topic Date Due    ZOSTER VACCINE AGE 60>  08/07/2009    MEDICARE YEARLY EXAM  01/05/2017    INFLUENZA AGE 9 TO ADULT  08/01/2017    EYE EXAM RETINAL OR DILATED Q1  09/06/2017    HEMOGLOBIN A1C Q6M  10/04/2017        Health Maintenance reviewed -  Plan:      Orders Placed This Encounter    XR CHEST PA LAT    US EXAM SCREENING AAA    DUPLEX CAROTID BILATERAL    CBC WITH AUTOMATED DIFF    LIPID PANEL    TSH REFLEX TO T4    METABOLIC PANEL, COMPREHENSIVE    HEMOGLOBIN A1C WITH EAG    MICROALBUMIN, UR, RAND W/ MICROALBUMIN/CREA RATIO    UA/M W/RFLX CULTURE, ROUTINE    PSA SCREENING (SCREENING)    AMB POC EKG ROUTINE W/ 12 LEADS, INTER & REP    omeprazole (PRILOSEC) 40 mg capsule    SITagliptin (JANUVIA) 100 mg tablet    metFORMIN (GLUCOPHAGE) 500 mg tablet           Plan:    Diagnoses and all orders for this visit:    1. Prostate cancer screening  -     PSA SCREENING (SCREENING)    2. Pure hypercholesterolemia  -     AMB POC EKG ROUTINE W/ 12 LEADS, INTER & REP    3. Controlled type 2 diabetes mellitus without complication, without long-term current use of insulin (HCC)  -     METABOLIC PANEL, COMPREHENSIVE  -     HEMOGLOBIN A1C WITH EAG  -     MICROALBUMIN, UR, RAND W/ MICROALBUMIN/CREA RATIO  -     UA/M W/RFLX CULTURE, ROUTINE  -     DUPLEX CAROTID BILATERAL; Future    4. Hyperlipidemia with target LDL less than 100  -     CBC WITH AUTOMATED DIFF  -     LIPID PANEL  -     TSH REFLEX TO T4  -     DUPLEX CAROTID BILATERAL; Future    5. H/O blastomycosis  -     XR CHEST PA LAT; Future    6. Screening for abdominal aortic aneurysm  -     US EXAM SCREENING AAA;  Future    Other orders  - SITagliptin (JANUVIA) 100 mg tablet; Take 1 Tab by mouth daily. -     metFORMIN (GLUCOPHAGE) 500 mg tablet; TAKE 2 TABS BY MOUTH TWO (2) TIMES DAILY (WITH MEALS)      Orders Placed This Encounter    XR CHEST PA LAT    US EXAM SCREENING AAA    DUPLEX CAROTID BILATERAL    CBC WITH AUTOMATED DIFF    LIPID PANEL    TSH REFLEX TO T4    METABOLIC PANEL, COMPREHENSIVE    HEMOGLOBIN A1C WITH EAG    MICROALBUMIN, UR, RAND W/ MICROALBUMIN/CREA RATIO    UA/M W/RFLX CULTURE, ROUTINE    PSA SCREENING (SCREENING)    AMB POC EKG ROUTINE W/ 12 LEADS, INTER & REP    omeprazole (PRILOSEC) 40 mg capsule    SITagliptin (JANUVIA) 100 mg tablet    metFORMIN (GLUCOPHAGE) 500 mg tablet         Follow-up Disposition: Not on File  Reviewed with patient the treatment plan, goals of treatment plan, and limitations of treatment plan, to include the potential for side effects from medications and procedures. If side effects occur, it is the responsibility of the patient to inform the clinic so that a change in the treatment plan can be made in a safe manner. The patient is advised that stopping prescribed medication may cause an increase in symptoms and possible medication withdrawal symptoms. The patient is informed an emergency room evaluation may be necessary if this occurs. Patient verbalized understanding and is in agreement with treatment plan as outlined above. All questions answered. Gastrointestinal stress ulcer prophylaxis and DVT prophylaxis administered

## 2024-02-22 NOTE — CARE COORDINATION ASSESSMENT. - NSCAREPROVIDERS_GEN_ALL_CORE_FT
CARE PROVIDERS:  Accepting Physician: Osiris Robles  Administration: Demetrius Grove  Administration: Cami Calvin  Admitting: Osiris Robles  Attending: Osiris Robles  Cardiology Technician: Fanny Wadsworth  Case Management: Jesse Barker  Consultant: Jeannie Garcia  Consultant: Ben Hamlin  Consultant: Attila Clemens  Covering Team: Merlin Chang  ED ACP: Latesha Lovett ED Attending: Torin Nieto  ED Nurse: Umu Singleton  Nurse: Kristy Krishnamurthy  Ordered: ADM, User  PCA/Nursing Assistant: Mary Pineda  PCA/Nursing Assistant: Fouzia Weinstein  PCA/Nursing Assistant: Cari Liu  PCA/Nursing Assistant: Mehdi Navarro  UR// Supp. Assoc.: Hans Glover// Supp. Assoc.: Scott Santacruz

## 2024-02-22 NOTE — H&P ADULT - HISTORY OF PRESENT ILLNESS
33-year-old male with past medical history of IBS presents with abdominal pain today.  Patient reports diffuse lower abdominal pain with nausea and vomiting.  Patient reports he had symptoms in the past back in December had a CAT scan at [facility] without acute findings.  Patient reports he recently moved here so does not have a gastroenterologist.  Patient did not take any for symptoms.  Patient denies fever chest pain shortness of breath history abdominal surgery. CT abd didn't show acute pathology , admitted for GI evaluation and management of acute gastroenteritis .Found to have WBC elevation and ID cons requested

## 2024-02-22 NOTE — ED ADULT NURSE REASSESSMENT NOTE - NS ED NURSE REASSESS COMMENT FT1
S/w pt, understands he is being admitted and awaiting IP bed. Maint fluids running, NAD at this time, c/o no pain, reports has not vomited in some time. Will continue to monitor.

## 2024-02-22 NOTE — ED ADULT NURSE REASSESSMENT NOTE - NS ED NURSE REASSESS COMMENT FT1
report received from previous rn. received patient asleep in stretcher easily arousable, with respirations even and nonlabored. LR infusing as per ordered. care ongoing.

## 2024-02-22 NOTE — H&P ADULT - PATIENT'S PREFERRED PRONOUN
Infusion Nursing Note:  Etta Cervantes presents today for Abraxane and Xgeva.    Patient seen by provider today: Yes:    present during visit today: Not Applicable.    Note:     Intravenous Access:  Implanted Port. Minimal bld return to port despite several NS flushes and position changes.  Labs drawn periph.  Alteplase to port, no bld return at 30 min and 60 min.  and again at 90 minutes.  Second dose of Alteplase installed.  Periph IV started per Dr. Cassidy for today's tmt. No results from second installation of Alteplase.  Pt sent for CXR to evaluate port. Face to face time 30 min.    Treatment Conditions:  Not Applicable.      Post Infusion Assessment:  Patient tolerated infusion without incident.  Blood return noted pre and post infusion.  Site patent and intact, free from redness, edema or discomfort.  No evidence of extravasations.  Access discontinued per protocol.    Discharge Plan:   Patient discharged in stable condition accompanied by: .    Yoanna Wilde RN                       Him/He

## 2024-02-22 NOTE — CARE COORDINATION ASSESSMENT. - NSPASTMEDSURGHISTORY_GEN_ALL_CORE_FT
PAST MEDICAL & SURGICAL HISTORY:  Allergic Asthma      Intractable nausea and vomiting      Cyclical vomiting syndrome      Marijuana smoker      Neck pain      Insomnia      No significant past surgical history      IBS (irritable bowel syndrome)

## 2024-02-22 NOTE — H&P ADULT - NSHPSOCIALHISTORY_GEN_ALL_CORE
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

## 2024-02-22 NOTE — H&P ADULT - NSHPLABSRESULTS_GEN_ALL_CORE
< from: CT Abdomen and Pelvis w/ IV Cont (02.21.24 @ 21:29) >    PROCEDURE:  CT of the Abdomen and Pelvis was performed.  Sagittal and coronal reformats were performed.    FINDINGS:  LOWER CHEST: Within normal limits.    LIVER: Within normal limits.  BILE DUCTS: Normal caliber.  GALLBLADDER: Within normal limits.  SPLEEN: Within normal limits.  PANCREAS: Within normal limits.  ADRENALS: Within normal limits.  KIDNEYS/URETERS: Kidneys enhance symmetrically without hydronephrosis or   focal renal parenchymal lesion.    BLADDER: Within normal limits.  REPRODUCTIVE ORGANS: Prostate gland is not enlarged.    BOWEL: Small hiatus hernia. No bowel obstruction or overt bowel wall   thickening. Appendix is normal.  PERITONEUM: No ascites, pneumoperitoneum, or loculated collection. No   mesenteric lymphadenopathy.  VESSELS: Within normal limits.  RETROPERITONEUM/LYMPH NODES: No lymphadenopathy.  ABDOMINAL WALL: Tiny fat-containing left periumbilical hernia.  BONES: Mild degenerative changes of the spine.    IMPRESSION:  No acute findings on CT the abdomen and pelvis to explain patient's   abdominal pain.    < end of copied text >

## 2024-02-22 NOTE — H&P ADULT - TIME BILLING
55minutes spent on this visit, 50% visit time spent in care co-ordination with other attendings and counselling patient ,writing admission orders ( see complete and current orders and order section) ,requesting necessary consults ,informing family about status & plan of care .I have discussed care plan with [facility] wellness/admitting /nursing   department ,outpatient PCP , hospital consultants , ER physician & med staff .

## 2024-02-22 NOTE — CONSULT NOTE ADULT - ASSESSMENT
Abdominal pain  Nausea/vomiting    CT noted, d/w patient  Anti-emetics prn  PPI  Advance diet as tolerated  Urine tox r/o cannabinoid hyperemesis syndrome  Monitor for BM, order GI PCR if diarrhea  Will follow    I reviewed the overnight course of events on the unit, re-confirming the patient history. I discussed the care with the patient  Differential diagnosis and plan of care discussed with patient after the evaluation  35 minutes spent on total encounter of which more than fifty percent of the encounter was spent counseling and/or coordinating care by the attending physician.

## 2024-02-22 NOTE — CONSULT NOTE ADULT - SUBJECTIVE AND OBJECTIVE BOX
[facility]  Luciano Garcia PA-C  [address] [place] [id number] [phone]      Chief Complaint:  Patient is a 33y old  Male who presents with a chief complaint of abdominal pain (22 Feb 2024 09:37)      HPI:  33-year-old male with past medical history of IBS presents with abdominal pain today.  Patient reports diffuse lower abdominal pain with nausea and vomiting.  Patient reports he had symptoms in the past back in December had a CAT scan at [facility] without acute findings.  Patient reports he recently moved here so does not have a gastroenterologist.  Patient did not take any for symptoms.  Patient denies fever chest pain shortness of breath history abdominal surgery. CT abd didn't show acute pathology , admitted for GI evaluation and management of acute gastroenteritis .Found to have WBC elevation and ID cons requested     INTERVAL HPI:  Pt s/e  Discussed same hx as above  Pt's nausea and vomiting improving, he was able to tolerate clear liquids  Hx of marijuana use  C/o left sided abdominal discomfort    Allergies:  Benadryl (Unknown)      Medications:  acetaminophen     Tablet .. 650 milliGRAM(s) Oral every 6 hours PRN  dicyclomine 10 milliGRAM(s) Oral daily PRN  ketorolac   Injectable 30 milliGRAM(s) IV Push three times a day PRN  ketorolac   Injectable 15 milliGRAM(s) IV Push three times a day PRN  lactated ringers. 1000 milliLiter(s) IV Continuous <Continuous>  melatonin 5 milliGRAM(s) Oral at bedtime PRN  ondansetron Injectable 4 milliGRAM(s) IV Push every 6 hours PRN  pantoprazole  Injectable 40 milliGRAM(s) IV Push daily      PMHX/PSHX:  Allergic Asthma    Insomnia    Neck pain    Marijuana smoker    Cyclical vomiting syndrome    Intractable nausea and vomiting    IBS (irritable bowel syndrome)    No significant past surgical history        Family history:  No pertinent family history in first degree relatives      ROS:   General:  No fevers, chills, night sweats, fatigue  Eyes:  Good vision, no reported pain  ENT:  No sore throat, pain, runny nose, dysphagia  CV:  No pain, palpitations, hypo/hypertension  Resp:  No dyspnea, cough, tachypnea, wheezing  GI:  +pain, +nausea, +vomiting, No diarrhea, No constipation, No weight loss, No fever, No pruritis, No rectal bleeding, No tarry stools, No dysphagia  :  No pain, bleeding, incontinence, nocturia  Muscle:  No pain, weakness  Neuro:  No weakness, tingling, memory problems  Psych:  No fatigue, insomnia, mood problems, depression  Endocrine:  No polyuria, polydipsia, cold/heat intolerance  Heme:  No petechiae, ecchymosis, easy bruisability  Skin:  No rash, tattoos, scars, edema      PHYSICAL EXAM:   Vital Signs:  Vital Signs Last 24 Hrs  T(C): 36.8 (22 Feb 2024 11:17), Max: 38.1 (22 Feb 2024 00:22)  T(F): 98.3 (22 Feb 2024 11:17), Max: 100.6 (22 Feb 2024 00:22)  HR: 70 (22 Feb 2024 11:17) (60 - 87)  BP: 149/80 (22 Feb 2024 11:17) (129/74 - 165/78)  BP(mean): --  RR: 16 (22 Feb 2024 11:17) (16 - 17)  SpO2: 98% (22 Feb 2024 11:17) (98% - 100%)    Parameters below as of 22 Feb 2024 11:17  Patient On (Oxygen Delivery Method): room air      Daily Height in cm: 180.34 (21 Feb 2024 19:34)    Daily     GENERAL:  Appears stated age  HEENT:  NC/AT  CHEST:  Full & symmetric excursion  HEART:  Regular rhythm  ABDOMEN:  Soft, non-tender, non-distended  EXTEREMITIES:  no cyanosis, clubbing or edema  SKIN:  No rash  NEURO:  Alert    LABS:                        14.1   14.28 )-----------( 190      ( 22 Feb 2024 06:00 )             40.4     02-22    145  |  115<H>  |  10  ----------------------------<  112<H>  4.0   |  26  |  0.92    Ca    8.8      22 Feb 2024 06:00  Phos  2.6     02-22  Mg     1.9     02-22    TPro  7.1  /  Alb  4.1  /  TBili  0.7  /  DBili  x   /  AST  25  /  ALT  20  /  AlkPhos  48  02-22    LIVER FUNCTIONS - ( 22 Feb 2024 06:00 )  Alb: 4.1 g/dL / Pro: 7.1 g/dL / ALK PHOS: 48 U/L / ALT: 20 U/L / AST: 25 U/L / GGT: x             Urinalysis Basic - ( 22 Feb 2024 06:00 )    Color: x / Appearance: x / SG: x / pH: x  Gluc: 112 mg/dL / Ketone: x  / Bili: x / Urobili: x   Blood: x / Protein: x / Nitrite: x   Leuk Esterase: x / RBC: x / WBC x   Sq Epi: x / Non Sq Epi: x / Bacteria: x        Lipase serum18       Imaging:  < from: CT Abdomen and Pelvis w/ IV Cont (02.21.24 @ 21:29) >    ACC: [id number] EXAM:  CT ABDOMEN AND PELVIS IC   ORDERED BY: UMER LEÓN     PROCEDURE DATE:  02/21/2024          INTERPRETATION:  CLINICAL INFORMATION: Abdominal pain.    COMPARISON: 1/9/2015.    CONTRAST/COMPLICATIONS:  IV Contrast: Omnipaque 350  90 cc administered   10 cc discarded  Oral Contrast: NONE  Complications: None reported at time of study completion    PROCEDURE:  CT of the Abdomen and Pelvis was performed.  Sagittal and coronal reformats were performed.    FINDINGS:  LOWER CHEST: Within normal limits.    LIVER: Within normal limits.  BILE DUCTS: Normal caliber.  GALLBLADDER: Within normal limits.  SPLEEN: Within normal limits.  PANCREAS: Within normal limits.  ADRENALS: Within normal limits.  KIDNEYS/URETERS: Kidneys enhance symmetrically without hydronephrosis or   focal renal parenchymal lesion.    BLADDER: Within normal limits.  REPRODUCTIVE ORGANS: Prostate gland is not enlarged.    BOWEL: Small hiatus hernia. No bowel obstruction or overt bowel wall   thickening. Appendix is normal.  PERITONEUM: No ascites, pneumoperitoneum, or loculated collection. No   mesenteric lymphadenopathy.  VESSELS: Within normal limits.  RETROPERITONEUM/LYMPH NODES: No lymphadenopathy.  ABDOMINAL WALL: Tiny fat-containing left periumbilical hernia.  BONES: Mild degenerative changes of the spine.    IMPRESSION:  No acute findings on CT the abdomen and pelvis to explain patient's   abdominal pain.    --- End of Report ---      ARACELI GUAMAN DO; Attending Radiologist  This document has been electronically signed. Feb 21 2024 10:08PM    < end of copied text >

## 2024-02-22 NOTE — H&P ADULT - PROBLEM SELECTOR PLAN 2
Patient's mother was diagnosed with Norovirus last week and was hospitalized. He has a mild fever 100.6 but no diarrhea at this time.   Abdominal CT was negative for any pathology at this time. He has mild leukocytosis that could be reactional to vomiting or dehydration.  # Vomiting  # Leukocytosis  # Norovirus contact last week  - Will follow labs and cultures   - Monitor WBC   - If diarrhea started will send GI PCR   - GI consult  - Will watch closely off antibiotics

## 2024-02-22 NOTE — CARE COORDINATION ASSESSMENT. - NSDCPLANSERVICES_GEN_ALL_CORE
Quality 431: Preventive Care And Screening: Unhealthy Alcohol Use - Screening: Patient not identified as an unhealthy alcohol user when screened for unhealthy alcohol use using a systematic screening method Quality 226: Preventive Care And Screening: Tobacco Use: Screening And Cessation Intervention: Patient screened for tobacco use and is an ex/non-smoker Detail Level: Detailed No Anticipated Discharge Needs

## 2024-02-22 NOTE — H&P ADULT - PROBLEM SELECTOR PLAN 10
last use 02/21 , patient states he was trying to alleviate Gi symptoms by using it but was not helpful

## 2024-02-22 NOTE — H&P ADULT - NSICDXPASTMEDICALHX_GEN_ALL_CORE_FT
PAST MEDICAL HISTORY:  Allergic Asthma     Cyclical vomiting syndrome     IBS (irritable bowel syndrome)     Insomnia     Intractable nausea and vomiting     Marijuana smoker     Neck pain      PAST MEDICAL HISTORY:  Allergic Asthma     Anxiety     Cyclical vomiting syndrome     IBS (irritable bowel syndrome)     Insomnia     Intractable nausea and vomiting     Marijuana smoker     Marijuana use     Neck pain

## 2024-02-22 NOTE — PATIENT PROFILE ADULT - FALL HARM RISK - HARM RISK INTERVENTIONS
Communicate Risk of Fall with Harm to all staff/Reinforce activity limits and safety measures with patient and family/Tailored Fall Risk Interventions/Use of alarms - bed, chair and/or voice tab/Visual Cue: Yellow wristband and red socks/Bed in lowest position, wheels locked, appropriate side rails in place/Call bell, personal items and telephone in reach/Instruct patient to call for assistance before getting out of bed or chair/Non-slip footwear when patient is out of bed/[place] to call system/Physically safe environment - no spills, clutter or unnecessary equipment/Purposeful Proactive Rounding/Room/bathroom lighting operational, light cord in reach

## 2024-02-22 NOTE — H&P ADULT - PROBLEM SELECTOR PLAN 1
CT noted, d/w patient  Anti-emetics prn  PPI  Advance diet as tolerated  Urine tox r/o cannabinoid hyperemesis syndrome  Monitor for BM, order GI PCR if diarrhea

## 2024-02-22 NOTE — CONSULT NOTE ADULT - SUBJECTIVE AND OBJECTIVE BOX
[facility]  INFECTIOUS DISEASES   [address] [place] [address]  Tel: [phone]     Fax: [phone]  ========================================================  MD Grant Meraz Kaushal, MD Cho, Michelle, MD Sunjit, Jaspal, MD  ========================================================    [id number]  SAY PEREZ     CC: Patient is a 33y old  Male who presents with a chief complaint of abdominal pain (22 Feb 2024 12:35)    HPI:  33-year-old male with past medical history of IBS presents with abdominal pain today.  Patient reports diffuse lower abdominal pain with nausea and vomiting.  Patient reports he had symptoms in the past back in December had a CAT scan at [facility] without acute findings.  Patient reports he recently moved here so does not have a gastroenterologist.  Patient did not take any for symptoms.  Patient denies fever chest pain shortness of breath history abdominal surgery. CT abd didn't show acute pathology , admitted for GI evaluation and management of acute gastroenteritis .Found to have WBC elevation and ID cons requested  (22 Feb 2024 09:37)    PAST MEDICAL & SURGICAL HISTORY:  Allergic Asthma  Insomnia  Neck pain  Marijuana smoker  Cyclical vomiting syndrome  Intractable nausea and vomiting  IBS (irritable bowel syndrome)  No significant past surgical history    Social Hx: No current smoking, social EtOH, smokes marijuana no other drugs     FAMILY HISTORY:  Noncontributory     Allergies  Benadryl (Unknown)    MEDICATIONS  (STANDING):  ALPRAZolam 0.5 milliGRAM(s) Oral once  lactated ringers. 1000 milliLiter(s) (125 mL/Hr) IV Continuous <Continuous>  pantoprazole  Injectable 40 milliGRAM(s) IV Push daily    MEDICATIONS  (PRN):  acetaminophen     Tablet .. 650 milliGRAM(s) Oral every 6 hours PRN Temp greater or equal to 38C (100.4F), Mild Pain (1 - 3)  dicyclomine 10 milliGRAM(s) Oral daily PRN abdominal pain  ketorolac   Injectable 30 milliGRAM(s) IV Push three times a day PRN Severe Pain (7 - 10)  ketorolac   Injectable 15 milliGRAM(s) IV Push three times a day PRN Moderate Pain (4 - 6)  melatonin 5 milliGRAM(s) Oral at bedtime PRN Insomnia  ondansetron Injectable 4 milliGRAM(s) IV Push every 6 hours PRN Nausea and/or Vomiting     REVIEW OF SYSTEMS:  CONSTITUTIONAL:  No Fever or chills  HEENT:  No diplopia or blurred vision.  No sore throat or runny nose.  CARDIOVASCULAR:  No chest pain   RESPIRATORY:  No cough, shortness of breath, PND or orthopnea.  GASTROINTESTINAL:  No nausea, vomiting or diarrhea.  GENITOURINARY:  No dysuria, frequency or urgency. No Blood in urine  MUSCULOSKELETAL:  no joint aches, no muscle pain  SKIN:  No change in skin, hair or nails.    Physical Exam:  Vital Signs Last 24 Hrs  T(C): 36.5 (22 Feb 2024 13:09), Max: 38.1 (22 Feb 2024 00:22)  T(F): 97.7 (22 Feb 2024 13:09), Max: 100.6 (22 Feb 2024 00:22)  HR: 56 (22 Feb 2024 13:09) (56 - 87)  BP: 133/75 (22 Feb 2024 13:09) (129/74 - 165/78)  BP(mean): --  RR: 17 (22 Feb 2024 13:09) (16 - 17)  SpO2: 98% (22 Feb 2024 13:09) (98% - 100%)  Parameters below as of 22 Feb 2024 13:09  Patient On (Oxygen Delivery Method): room air  Height (cm): 180.3 (02-21 @ 19:34)  Weight (kg): 68 (02-21 @ 19:34)  BMI (kg/m2): 20.9 (02-21 @ 19:34)  BSA (m2): 1.87 (02-21 @ 19:34)  GEN: NAD  HEENT: normocephalic and atraumatic. EOMI. PERRL.    NECK: Supple.  No lymphadenopathy   LUNGS: Clear to auscultation.  HEART: Regular rate and rhythm without murmur.  ABDOMEN: Soft, nontender, and nondistended.  Positive bowel sounds.    : No CVA tenderness  EXTREMITIES: Without edema.  NEUROLOGIC: grossly intact.  PSYCHIATRIC: Appropriate affect .  SKIN: No rash     Labs:  02-22    145  |  115<H>  |  10  ----------------------------<  112<H>  4.0   |  26  |  0.92    Ca    8.8      22 Feb 2024 06:00  Phos  2.6     02-22  Mg     1.9     02-22    TPro  7.1  /  Alb  4.1  /  TBili  0.7  /  DBili  x   /  AST  25  /  ALT  20  /  AlkPhos  48  02-22                        14.1   14.28 )-----------( 190      ( 22 Feb 2024 06:00 )             40.4     Urinalysis Basic - ( 22 Feb 2024 06:00 )    Color: x / Appearance: x / SG: x / pH: x  Gluc: 112 mg/dL / Ketone: x  / Bili: x / Urobili: x   Blood: x / Protein: x / Nitrite: x   Leuk Esterase: x / RBC: x / WBC x   Sq Epi: x / Non Sq Epi: x / Bacteria: x    LIVER FUNCTIONS - ( 22 Feb 2024 06:00 )  Alb: 4.1 g/dL / Pro: 7.1 g/dL / ALK PHOS: 48 U/L / ALT: 20 U/L / AST: 25 U/L / GGT: x           SARS-CoV-2 Result: NotDetec (02-22-24 @ 01:35)    All imaging and other data have been reviewed.  < from: Xray Chest 1 View- PORTABLE-Urgent (Xray Chest 1 View- PORTABLE-Urgent .) (02.22.24 @ 05:50) >  IMPRESSION: No focal infiltrate or congestion. Heart is within normal   limits in its transthoracic diameter. Regional osseous structures   appropriate for age    < from: CT Abdomen and Pelvis w/ IV Cont (02.21.24 @ 21:29) >  IMPRESSION:  No acute findings on CT the abdomen and pelvis to explain patient's   abdominal pain.      Assessment and Plan:   34yo facundo man with PMH of IBS presents with cyclic pain and vomiting with diarrhea has been admitted with vomiting and abdominal pain.  His mother was diagnosed with Norovirus last week and was hospitalized at [facility]. He has a mild fever 100.6 but no diarrhea at this time.   Abdominal CT was negative for any pathology at this time. He has mild leukocytosis that could be reactional to vomiting or dehydration.    # Vomiting  # Leukocytosis  # Norovirus contact last week    - Will follow labs and cultures   - Monitor WBC   - If diarrhea started will send GI PCR   - GI consult  - Will watch closely off antibiotics (had one dose of zosyn in ED)    Thank you for courtesy of this consult.     Will follow.  Discussed with the primary team.     Steven Fish MD  Division of Infectious Diseases   Please call ID service at [phone] with any question.    75 minutes spent on total encounter assessing patient, examination, chart review, counseling and coordinating care by the attending physician/nurse/care manager.   [facility]  INFECTIOUS DISEASES   [address] [place] [address]  Tel: [phone]     Fax: [phone]  ========================================================  MD Grant Meraz Kaushal, MD Cho, Michelle, MD Sunjit, Jaspal, MD  ========================================================    [id number]  SAY PEREZ     CC: Patient is a 33y old  Male who presents with a chief complaint of abdominal pain (22 Feb 2024 12:35)    HPI:  33-year-old male with past medical history of IBS presents with abdominal pain today.  Patient reports diffuse lower abdominal pain with nausea and vomiting.  Patient reports he had symptoms in the past back in December had a CAT scan at [facility] without acute findings.  Patient reports he recently moved here so does not have a gastroenterologist.  Patient did not take any for symptoms.  Patient denies fever chest pain shortness of breath history abdominal surgery. CT abd didn't show acute pathology , admitted for GI evaluation and management of acute gastroenteritis .Found to have WBC elevation and ID cons requested  (22 Feb 2024 09:37)    PAST MEDICAL & SURGICAL HISTORY:  Allergic Asthma  Insomnia  Neck pain  Marijuana smoker  Cyclical vomiting syndrome  Intractable nausea and vomiting  IBS (irritable bowel syndrome)  No significant past surgical history    Social Hx: No current smoking, social EtOH, smokes marijuana no other drugs     FAMILY HISTORY:  Noncontributory     Allergies  Benadryl (Unknown)    MEDICATIONS  (STANDING):  ALPRAZolam 0.5 milliGRAM(s) Oral once  lactated ringers. 1000 milliLiter(s) (125 mL/Hr) IV Continuous <Continuous>  pantoprazole  Injectable 40 milliGRAM(s) IV Push daily    MEDICATIONS  (PRN):  acetaminophen     Tablet .. 650 milliGRAM(s) Oral every 6 hours PRN Temp greater or equal to 38C (100.4F), Mild Pain (1 - 3)  dicyclomine 10 milliGRAM(s) Oral daily PRN abdominal pain  ketorolac   Injectable 30 milliGRAM(s) IV Push three times a day PRN Severe Pain (7 - 10)  ketorolac   Injectable 15 milliGRAM(s) IV Push three times a day PRN Moderate Pain (4 - 6)  melatonin 5 milliGRAM(s) Oral at bedtime PRN Insomnia  ondansetron Injectable 4 milliGRAM(s) IV Push every 6 hours PRN Nausea and/or Vomiting     REVIEW OF SYSTEMS:  CONSTITUTIONAL:  No Fever or chills  HEENT:  No diplopia or blurred vision.  No sore throat or runny nose.  CARDIOVASCULAR:  No chest pain   RESPIRATORY:  No cough, shortness of breath, PND or orthopnea.  GASTROINTESTINAL:  No nausea, vomiting or diarrhea.  GENITOURINARY:  No dysuria, frequency or urgency. No Blood in urine  MUSCULOSKELETAL:  no joint aches, no muscle pain  SKIN:  No change in skin, hair or nails.    Physical Exam:  Vital Signs Last 24 Hrs  T(C): 36.5 (22 Feb 2024 13:09), Max: 38.1 (22 Feb 2024 00:22)  T(F): 97.7 (22 Feb 2024 13:09), Max: 100.6 (22 Feb 2024 00:22)  HR: 56 (22 Feb 2024 13:09) (56 - 87)  BP: 133/75 (22 Feb 2024 13:09) (129/74 - 165/78)  BP(mean): --  RR: 17 (22 Feb 2024 13:09) (16 - 17)  SpO2: 98% (22 Feb 2024 13:09) (98% - 100%)  Parameters below as of 22 Feb 2024 13:09  Patient On (Oxygen Delivery Method): room air  Height (cm): 180.3 (02-21 @ 19:34)  Weight (kg): 68 (02-21 @ 19:34)  BMI (kg/m2): 20.9 (02-21 @ 19:34)  BSA (m2): 1.87 (02-21 @ 19:34)  GEN: NAD  HEENT: normocephalic and atraumatic. EOMI. PERRL.    NECK: Supple.  No lymphadenopathy   LUNGS: Clear to auscultation.  HEART: Regular rate and rhythm without murmur.  ABDOMEN: Soft, nontender, and nondistended.  Positive bowel sounds.    : No CVA tenderness  EXTREMITIES: Without edema.  NEUROLOGIC: grossly intact.  PSYCHIATRIC: Appropriate affect .  SKIN: No rash     Labs:  02-22    145  |  115<H>  |  10  ----------------------------<  112<H>  4.0   |  26  |  0.92    Ca    8.8      22 Feb 2024 06:00  Phos  2.6     02-22  Mg     1.9     02-22    TPro  7.1  /  Alb  4.1  /  TBili  0.7  /  DBili  x   /  AST  25  /  ALT  20  /  AlkPhos  48  02-22                        14.1   14.28 )-----------( 190      ( 22 Feb 2024 06:00 )             40.4     Urinalysis Basic - ( 22 Feb 2024 06:00 )    Color: x / Appearance: x / SG: x / pH: x  Gluc: 112 mg/dL / Ketone: x  / Bili: x / Urobili: x   Blood: x / Protein: x / Nitrite: x   Leuk Esterase: x / RBC: x / WBC x   Sq Epi: x / Non Sq Epi: x / Bacteria: x    LIVER FUNCTIONS - ( 22 Feb 2024 06:00 )  Alb: 4.1 g/dL / Pro: 7.1 g/dL / ALK PHOS: 48 U/L / ALT: 20 U/L / AST: 25 U/L / GGT: x           SARS-CoV-2 Result: NotDetec (02-22-24 @ 01:35)    All imaging and other data have been reviewed.  < from: Xray Chest 1 View- PORTABLE-Urgent (Xray Chest 1 View- PORTABLE-Urgent .) (02.22.24 @ 05:50) >  IMPRESSION: No focal infiltrate or congestion. Heart is within normal   limits in its transthoracic diameter. Regional osseous structures   appropriate for age    < from: CT Abdomen and Pelvis w/ IV Cont (02.21.24 @ 21:29) >  IMPRESSION:  No acute findings on CT the abdomen and pelvis to explain patient's   abdominal pain.      Assessment and Plan:   34yo facundo man with PMH of IBS presents with cyclic pain and vomiting with diarrhea has been admitted with vomiting and abdominal pain.  His mother was diagnosed with Norovirus last week and was hospitalized. He has a mild fever 100.6 but no diarrhea at this time.   Abdominal CT was negative for any pathology at this time. He has mild leukocytosis that could be reactional to vomiting or dehydration.    # Vomiting  # Leukocytosis  # Norovirus contact last week    - Will follow labs and cultures   - Monitor WBC   - If diarrhea started will send GI PCR   - GI consult  - Will watch closely off antibiotics     Thank you for courtesy of this consult.     Will follow.  Discussed with the primary team.     Steven Fish MD  Division of Infectious Diseases   Please call ID service at [phone] with any question.    75 minutes spent on total encounter assessing patient, examination, chart review, counseling and coordinating care by the attending physician/nurse/care manager.

## 2024-02-22 NOTE — H&P ADULT - ASSESSMENT
33-year-old male with past medical history of IBS presents with abdominal pain today.  Patient reports diffuse lower abdominal pain with nausea and vomiting.  Patient reports he had symptoms in the past back in December had a CAT scan at [facility] without acute findings.  Patient reports he recently moved here so does not have a gastroenterologist.  Patient did not take any for symptoms.  Patient denies fever chest pain shortness of breath history abdominal surgery. CT abd didn't show acute pathology , admitted for GI evaluation and management of acute gastroenteritis .Found to have WBC elevation and ID cons requested  33-year-old male with past medical history of IBS presents with abdominal pain today.  Patient reports diffuse lower abdominal pain with nausea and vomiting.  Patient reports he had symptoms in the past back in December had a CAT scan at [facility] without acute findings.  Patient reports he recently moved here so does not have a gastroenterologist.  Patient did not take any for symptoms.  Patient denies fever chest pain shortness of breath history abdominal surgery. CT abd didn't show acute pathology , admitted for GI evaluation and management of acute gastroenteritis .Found to have WBC elevation and ID cons requested His mother was diagnosed with Norovirus last week and was hospitalized. He has a mild fever 100.6 but no diarrhea at this time.   Abdominal CT was negative for any pathology at this time. He has mild leukocytosis that could be reactional to vomiting or dehydration.  # Vomiting  # Leukocytosis  # Norovirus contact last week  - Will follow labs and cultures   - Monitor WBC   - If diarrhea started will send GI PCR   - GI consult  - Will watch closely off antibiotics

## 2024-02-22 NOTE — CARE COORDINATION ASSESSMENT. - OTHER PERTINENT DISCHARGE PLANNING INFORMATION:
Met with patient at bedside to discuss the role of case management with verbalized understanding.  Patient seen in ED and anticipate inpatient admission.  Patient states he resides in [place] with wife and is visiting and staying with mother.  Denies community MD at present.  Will monitor for transition needs and remain available.

## 2024-02-23 ENCOUNTER — TRANSCRIPTION ENCOUNTER (OUTPATIENT)
Age: 34
End: 2024-02-23

## 2024-02-23 VITALS
DIASTOLIC BLOOD PRESSURE: 74 MMHG | RESPIRATION RATE: 18 BRPM | SYSTOLIC BLOOD PRESSURE: 132 MMHG | TEMPERATURE: 99 F | HEART RATE: 56 BPM | OXYGEN SATURATION: 97 %

## 2024-02-23 LAB
ALBUMIN SERPL ELPH-MCNC: 3.7 G/DL — SIGNIFICANT CHANGE UP (ref 3.3–5)
ALP SERPL-CCNC: 43 U/L — SIGNIFICANT CHANGE UP (ref 40–120)
ALT FLD-CCNC: 36 U/L — SIGNIFICANT CHANGE UP (ref 12–78)
AMYLASE P1 CFR SERPL: 60 U/L — SIGNIFICANT CHANGE UP (ref 25–125)
ANION GAP SERPL CALC-SCNC: 9 MMOL/L — SIGNIFICANT CHANGE UP (ref 5–17)
AST SERPL-CCNC: 70 U/L — HIGH (ref 15–37)
BASOPHILS # BLD AUTO: 0.05 K/UL — SIGNIFICANT CHANGE UP (ref 0–0.2)
BASOPHILS NFR BLD AUTO: 0.5 % — SIGNIFICANT CHANGE UP (ref 0–2)
BILIRUB SERPL-MCNC: 1 MG/DL — SIGNIFICANT CHANGE UP (ref 0.2–1.2)
BUN SERPL-MCNC: 10 MG/DL — SIGNIFICANT CHANGE UP (ref 7–23)
CALCIUM SERPL-MCNC: 8.7 MG/DL — SIGNIFICANT CHANGE UP (ref 8.5–10.1)
CHLORIDE SERPL-SCNC: 108 MMOL/L — SIGNIFICANT CHANGE UP (ref 96–108)
CO2 SERPL-SCNC: 26 MMOL/L — SIGNIFICANT CHANGE UP (ref 22–31)
CREAT SERPL-MCNC: 0.88 MG/DL — SIGNIFICANT CHANGE UP (ref 0.5–1.3)
EGFR: 118 ML/MIN/1.73M2 — SIGNIFICANT CHANGE UP
EOSINOPHIL # BLD AUTO: 0.09 K/UL — SIGNIFICANT CHANGE UP (ref 0–0.5)
EOSINOPHIL NFR BLD AUTO: 0.9 % — SIGNIFICANT CHANGE UP (ref 0–6)
GLUCOSE SERPL-MCNC: 82 MG/DL — SIGNIFICANT CHANGE UP (ref 70–99)
HCT VFR BLD CALC: 39.5 % — SIGNIFICANT CHANGE UP (ref 39–50)
HGB BLD-MCNC: 13.7 G/DL — SIGNIFICANT CHANGE UP (ref 13–17)
IMM GRANULOCYTES NFR BLD AUTO: 0.3 % — SIGNIFICANT CHANGE UP (ref 0–0.9)
LIDOCAIN IGE QN: 21 U/L — SIGNIFICANT CHANGE UP (ref 13–75)
LYMPHOCYTES # BLD AUTO: 3.71 K/UL — HIGH (ref 1–3.3)
LYMPHOCYTES # BLD AUTO: 38.5 % — SIGNIFICANT CHANGE UP (ref 13–44)
MCHC RBC-ENTMCNC: 32.5 PG — SIGNIFICANT CHANGE UP (ref 27–34)
MCHC RBC-ENTMCNC: 34.7 GM/DL — SIGNIFICANT CHANGE UP (ref 32–36)
MCV RBC AUTO: 93.8 FL — SIGNIFICANT CHANGE UP (ref 80–100)
MONOCYTES # BLD AUTO: 0.77 K/UL — SIGNIFICANT CHANGE UP (ref 0–0.9)
MONOCYTES NFR BLD AUTO: 8 % — SIGNIFICANT CHANGE UP (ref 2–14)
NEUTROPHILS # BLD AUTO: 4.99 K/UL — SIGNIFICANT CHANGE UP (ref 1.8–7.4)
NEUTROPHILS NFR BLD AUTO: 51.8 % — SIGNIFICANT CHANGE UP (ref 43–77)
NRBC # BLD: 0 /100 WBCS — SIGNIFICANT CHANGE UP (ref 0–0)
PCP SPEC-MCNC: SIGNIFICANT CHANGE UP
PLATELET # BLD AUTO: 144 K/UL — LOW (ref 150–400)
POTASSIUM SERPL-MCNC: 3.7 MMOL/L — SIGNIFICANT CHANGE UP (ref 3.5–5.3)
POTASSIUM SERPL-SCNC: 3.7 MMOL/L — SIGNIFICANT CHANGE UP (ref 3.5–5.3)
PROCALCITONIN SERPL-MCNC: 0.03 NG/ML — SIGNIFICANT CHANGE UP
PROT SERPL-MCNC: 6.3 G/DL — SIGNIFICANT CHANGE UP (ref 6–8.3)
RBC # BLD: 4.21 M/UL — SIGNIFICANT CHANGE UP (ref 4.2–5.8)
RBC # FLD: 12 % — SIGNIFICANT CHANGE UP (ref 10.3–14.5)
SODIUM SERPL-SCNC: 143 MMOL/L — SIGNIFICANT CHANGE UP (ref 135–145)
WBC # BLD: 9.64 K/UL — SIGNIFICANT CHANGE UP (ref 3.8–10.5)
WBC # FLD AUTO: 9.64 K/UL — SIGNIFICANT CHANGE UP (ref 3.8–10.5)

## 2024-02-23 PROCEDURE — 84100 ASSAY OF PHOSPHORUS: CPT

## 2024-02-23 PROCEDURE — 81001 URINALYSIS AUTO W/SCOPE: CPT

## 2024-02-23 PROCEDURE — 82150 ASSAY OF AMYLASE: CPT

## 2024-02-23 PROCEDURE — 71045 X-RAY EXAM CHEST 1 VIEW: CPT

## 2024-02-23 PROCEDURE — 96374 THER/PROPH/DIAG INJ IV PUSH: CPT

## 2024-02-23 PROCEDURE — 99232 SBSQ HOSP IP/OBS MODERATE 35: CPT

## 2024-02-23 PROCEDURE — 80061 LIPID PANEL: CPT

## 2024-02-23 PROCEDURE — 84145 PROCALCITONIN (PCT): CPT

## 2024-02-23 PROCEDURE — 96375 TX/PRO/DX INJ NEW DRUG ADDON: CPT

## 2024-02-23 PROCEDURE — 96372 THER/PROPH/DIAG INJ SC/IM: CPT

## 2024-02-23 PROCEDURE — 36415 COLL VENOUS BLD VENIPUNCTURE: CPT

## 2024-02-23 PROCEDURE — 84484 ASSAY OF TROPONIN QUANT: CPT

## 2024-02-23 PROCEDURE — 83690 ASSAY OF LIPASE: CPT

## 2024-02-23 PROCEDURE — 80053 COMPREHEN METABOLIC PANEL: CPT

## 2024-02-23 PROCEDURE — 83735 ASSAY OF MAGNESIUM: CPT

## 2024-02-23 PROCEDURE — 74177 CT ABD & PELVIS W/CONTRAST: CPT | Mod: MA

## 2024-02-23 PROCEDURE — 87637 SARSCOV2&INF A&B&RSV AMP PRB: CPT

## 2024-02-23 PROCEDURE — 93005 ELECTROCARDIOGRAM TRACING: CPT

## 2024-02-23 PROCEDURE — 83605 ASSAY OF LACTIC ACID: CPT

## 2024-02-23 PROCEDURE — 85025 COMPLETE CBC W/AUTO DIFF WBC: CPT

## 2024-02-23 PROCEDURE — 84443 ASSAY THYROID STIM HORMONE: CPT

## 2024-02-23 PROCEDURE — 99285 EMERGENCY DEPT VISIT HI MDM: CPT | Mod: 25

## 2024-02-23 PROCEDURE — 80307 DRUG TEST PRSMV CHEM ANLYZR: CPT

## 2024-02-23 PROCEDURE — 85027 COMPLETE CBC AUTOMATED: CPT

## 2024-02-23 RX ORDER — IBUPROFEN 200 MG
400 TABLET ORAL ONCE
Refills: 0 | Status: COMPLETED | OUTPATIENT
Start: 2024-02-23 | End: 2024-02-23

## 2024-02-23 RX ORDER — OMEPRAZOLE 10 MG/1
1 CAPSULE, DELAYED RELEASE ORAL
Qty: 0 | Refills: 0 | DISCHARGE

## 2024-02-23 RX ORDER — ACETAMINOPHEN 500 MG
2 TABLET ORAL
Qty: 0 | Refills: 0 | DISCHARGE
Start: 2024-02-23

## 2024-02-23 RX ORDER — LANOLIN ALCOHOL/MO/W.PET/CERES
1 CREAM (GRAM) TOPICAL
Qty: 0 | Refills: 0 | DISCHARGE
Start: 2024-02-23

## 2024-02-23 RX ADMIN — Medication 400 MILLIGRAM(S): at 12:53

## 2024-02-23 RX ADMIN — PANTOPRAZOLE SODIUM 40 MILLIGRAM(S): 20 TABLET, DELAYED RELEASE ORAL at 11:22

## 2024-02-23 RX ADMIN — Medication 650 MILLIGRAM(S): at 12:22

## 2024-02-23 RX ADMIN — Medication 650 MILLIGRAM(S): at 11:22

## 2024-02-23 RX ADMIN — Medication 400 MILLIGRAM(S): at 13:53

## 2024-02-23 NOTE — DISCHARGE NOTE NURSING/CASE MANAGEMENT/SOCIAL WORK - NSDCPEFALRISK_GEN_ALL_CORE
For information on Fall & Injury Prevention, visit: [email or web address] OR  [email or web address] OR  https://www.cdc.gov/steadi/patient.html

## 2024-02-23 NOTE — DISCHARGE NOTE PROVIDER - NSDCCAREPROVSEEN_GEN_ALL_CORE_FT
Sharla, Narayan Hamlin, Ben Chang, Merlin Robles, Osiris Clemens, Attila Macedo, Hayden Garcia, Jeannie Sutherland, Deb

## 2024-02-23 NOTE — DISCHARGE NOTE PROVIDER - NSDCMRMEDTOKEN_GEN_ALL_CORE_FT
acetaminophen 325 mg oral tablet: 2 tab(s) orally every 6 hours As needed Temp greater or equal to 38C (100.4F), Mild Pain (1 - 3)  melatonin 5 mg oral tablet: 1 tab(s) orally once a day (at bedtime) as needed for  insomnia  naproxen 500 mg oral tablet: 1 tab(s) orally 2 times a day as needed for  moderate pain  ondansetron 4 mg oral tablet, disintegratin tab(s) orally every 6 hours as needed for  nausea  PriLOSEC OTC 20 mg oral delayed release tablet: 1 tab(s) orally once a day

## 2024-02-23 NOTE — PROGRESS NOTE ADULT - SUBJECTIVE AND OBJECTIVE BOX
[facility]  INFECTIOUS DISEASES   [address] [place] [address]  Tel: [phone]     Fax: [phone]  ========================================================  MD Grant Meraz Kaushal, MD Cho, Michelle, MD Sunjit, Jaspal, MD  ========================================================    [id number]  SAY PEREZ     Follow up: No more abdominal pain, no diarrhea, no more vomiting, had regular diet today with no issue. No fever.     PAST MEDICAL & SURGICAL HISTORY:  Allergic Asthma  Insomnia  Neck pain  Marijuana smoker  Cyclical vomiting syndrome  Intractable nausea and vomiting  IBS (irritable bowel syndrome)  No significant past surgical history    Social Hx: No current smoking, social EtOH, smokes marijuana no other drugs     FAMILY HISTORY:  Noncontributory     Allergies  Benadryl (Unknown)    MEDICATIONS  (STANDING):  ALPRAZolam 0.5 milliGRAM(s) Oral once  lactated ringers. 1000 milliLiter(s) (125 mL/Hr) IV Continuous <Continuous>  pantoprazole  Injectable 40 milliGRAM(s) IV Push daily    MEDICATIONS  (PRN):  acetaminophen     Tablet .. 650 milliGRAM(s) Oral every 6 hours PRN Temp greater or equal to 38C (100.4F), Mild Pain (1 - 3)  dicyclomine 10 milliGRAM(s) Oral daily PRN abdominal pain  ketorolac   Injectable 30 milliGRAM(s) IV Push three times a day PRN Severe Pain (7 - 10)  ketorolac   Injectable 15 milliGRAM(s) IV Push three times a day PRN Moderate Pain (4 - 6)  melatonin 5 milliGRAM(s) Oral at bedtime PRN Insomnia  ondansetron Injectable 4 milliGRAM(s) IV Push every 6 hours PRN Nausea and/or Vomiting     REVIEW OF SYSTEMS:  CONSTITUTIONAL:  No Fever or chills  HEENT:  No diplopia or blurred vision.  No sore throat or runny nose.  CARDIOVASCULAR:  No chest pain   RESPIRATORY:  No cough, shortness of breath, PND or orthopnea.  GASTROINTESTINAL:  No nausea, vomiting or diarrhea.  GENITOURINARY:  No dysuria, frequency or urgency. No Blood in urine  MUSCULOSKELETAL:  no joint aches, no muscle pain  SKIN:  No change in skin, hair or nails.    Physical Exam:  Vital Signs Last 24 Hrs  T(C): 36.6 (23 Feb 2024 05:36), Max: 37 (22 Feb 2024 19:54)  T(F): 97.8 (23 Feb 2024 05:36), Max: 98.6 (22 Feb 2024 19:54)  HR: 52 (23 Feb 2024 05:36) (52 - 60)  BP: 120/71 (23 Feb 2024 05:36) (120/71 - 134/79)  BP(mean): --  RR: 19 (23 Feb 2024 05:36) (18 - 19)  SpO2: 98% (23 Feb 2024 05:36) (96% - 98%)  Parameters below as of 23 Feb 2024 05:36  Patient On (Oxygen Delivery Method): room air  GEN: NAD  HEENT: normocephalic and atraumatic. EOMI. PERRL.    NECK: Supple.  No lymphadenopathy   LUNGS: Clear to auscultation.  HEART: Regular rate and rhythm without murmur.  ABDOMEN: Soft, nontender, and nondistended.  Positive bowel sounds.    : No CVA tenderness  EXTREMITIES: Without edema.  NEUROLOGIC: grossly intact.  PSYCHIATRIC: Appropriate affect .  SKIN: No rash     Labs:                        13.7   9.64  )-----------( 144      ( 23 Feb 2024 06:45 )             39.5     02-23    143  |  108  |  10  ----------------------------<  82  3.7   |  26  |  0.88    Ca    8.7      23 Feb 2024 06:45  Phos  2.6     02-22  Mg     1.9     02-22    TPro  6.3  /  Alb  3.7  /  TBili  1.0  /  DBili  x   /  AST  70<H>  /  ALT  36  /  AlkPhos  43  02-23    WBC Count: 9.64 K/uL (02-23-24 @ 06:45)  WBC Count: 14.28 K/uL (02-22-24 @ 06:00)  WBC Count: 13.64 K/uL (02-21-24 @ 20:15)    Creatinine: 0.88 mg/dL (02-23-24 @ 06:45)  Creatinine: 0.92 mg/dL (02-22-24 @ 06:00)  Creatinine: 1.20 mg/dL (02-21-24 @ 20:15)    Procalcitonin, Serum: 0.03 ng/mL (02-23-24 @ 06:45)     SARS-CoV-2 Result: NotDetec (02-22-24 @ 01:35)    All imaging and other data have been reviewed.  < from: Xray Chest 1 View- PORTABLE-Urgent (Xray Chest 1 View- PORTABLE-Urgent .) (02.22.24 @ 05:50) >  IMPRESSION: No focal infiltrate or congestion. Heart is within normal   limits in its transthoracic diameter. Regional osseous structures   appropriate for age    < from: CT Abdomen and Pelvis w/ IV Cont (02.21.24 @ 21:29) >  IMPRESSION:  No acute findings on CT the abdomen and pelvis to explain patient's   abdominal pain.      Assessment and Plan:   34yo facundo man with PMH of IBS presents with cyclic pain and vomiting with diarrhea has been admitted with vomiting and abdominal pain.  His mother was diagnosed with Norovirus last week and was hospitalized. He has a mild fever 100.6 but no diarrhea at this time.   Abdominal CT was negative for any pathology at this time. He has mild leukocytosis that could be reactional to vomiting or dehydration.    # Vomiting  # Leukocytosis  # Norovirus contact last week    - WBC back to normal   - No diarrhea or vomiting  - Tolerating regular diet   - GI consult noted   - Will watch off antibiotics     Will sign off please call with any question.   Discussed with the primary team.     Steven Fish MD  Division of Infectious Diseases   Please call ID service at [phone] with any question.    50 minutes spent on total encounter assessing patient, examination, chart review, counseling and coordinating care by the attending physician/nurse/care manager.  
[facility]  Luciano Garcia PA-C  [address] [place] [address]  [phone]      INTERVAL HPI/OVERNIGHT EVENTS:  Pt s/e  Nausea and vomiting resolved  Tolerated regular diet    MEDICATIONS  (STANDING):  lactated ringers. 1000 milliLiter(s) (125 mL/Hr) IV Continuous <Continuous>  pantoprazole  Injectable 40 milliGRAM(s) IV Push daily    MEDICATIONS  (PRN):  acetaminophen     Tablet .. 650 milliGRAM(s) Oral every 6 hours PRN Temp greater or equal to 38C (100.4F), Mild Pain (1 - 3)  dicyclomine 10 milliGRAM(s) Oral daily PRN abdominal pain  ketorolac   Injectable 30 milliGRAM(s) IV Push three times a day PRN Severe Pain (7 - 10)  ketorolac   Injectable 15 milliGRAM(s) IV Push three times a day PRN Moderate Pain (4 - 6)  melatonin 5 milliGRAM(s) Oral at bedtime PRN Insomnia  ondansetron Injectable 4 milliGRAM(s) IV Push every 6 hours PRN Nausea and/or Vomiting      Allergies    Benadryl (Unknown)      PHYSICAL EXAM:   Vital Signs:  Vital Signs Last 24 Hrs  T(C): 36.6 (2024 05:36), Max: 37 (2024 19:54)  T(F): 97.8 (2024 05:36), Max: 98.6 (2024 19:54)  HR: 52 (2024 05:36) (52 - 70)  BP: 120/71 (2024 05:36) (120/71 - 149/80)  BP(mean): --  RR: 19 (2024 05:36) (16 - 19)  SpO2: 98% (2024 05:36) (96% - 98%)    Parameters below as of 2024 05:36  Patient On (Oxygen Delivery Method): room air      Daily     Daily Weight in k.6 (2024 05:36)    GENERAL:  Appears stated age  HEENT:  NC/AT  CHEST:  Full & symmetric excursion  HEART:  Regular rhythm  ABDOMEN:  Soft, non-tender, non-distended  EXTEREMITIES:  no cyanosis  SKIN:  No rash  NEURO:  Alert      LABS:                        13.7   9.64  )-----------( 144      ( 2024 06:45 )             39.5         145  |  115<H>  |  10  ----------------------------<  112<H>  4.0   |  26  |  0.92    Ca    8.8      2024 06:00  Phos  2.6       Mg     1.9         TPro  7.1  /  Alb  4.1  /  TBili  0.7  /  DBili  x   /  AST  25  /  ALT  20  /  AlkPhos  48        Urinalysis Basic - ( 2024 06:00 )    Color: x / Appearance: x / SG: x / pH: x  Gluc: 112 mg/dL / Ketone: x  / Bili: x / Urobili: x   Blood: x / Protein: x / Nitrite: x   Leuk Esterase: x / RBC: x / WBC x   Sq Epi: x / Non Sq Epi: x / Bacteria: x

## 2024-02-23 NOTE — PROGRESS NOTE ADULT - ASSESSMENT
Abdominal pain  Nausea/vomiting    CT noted, d/w patient  Anti-emetics prn  PPI  Regular diet  Urine tox r/o cannabinoid hyperemesis syndrome  Discussed marijuana cessation  Symptoms resolved. No GI objection to d/c planning  Will follow    I reviewed the overnight course of events on the unit, re-confirming the patient history. I discussed the care with the patient  Differential diagnosis and plan of care discussed with patient after the evaluation  35 minutes spent on total encounter of which more than fifty percent of the encounter was spent counseling and/or coordinating care by the attending physician.

## 2024-02-23 NOTE — DISCHARGE NOTE NURSING/CASE MANAGEMENT/SOCIAL WORK - PATIENT PORTAL LINK FT
You can access the FollowMyHealth Patient Portal offered by [facility] by registering at the following website: [email or web address]. By joining [employer]’s FollowMyHealth portal, you will also be able to view your health information using other applications (apps) compatible with our system.

## 2024-02-23 NOTE — DISCHARGE NOTE PROVIDER - CARE PROVIDER_API CALL
Jf Ngo  Gastroenterology  [address] [place] [address]  Phone: [phone]  Fax: [phone]  Follow Up Time: 1 week

## 2024-02-23 NOTE — DISCHARGE NOTE PROVIDER - HOSPITAL COURSE
33-year-old male with past medical history of IBS presents with abdominal pain today.  Patient reports diffuse lower abdominal pain with nausea and vomiting.  Patient reports he had symptoms in the past back in December had a CAT scan at [facility] without acute findings.  Patient reports he recently moved here so does not have a gastroenterologist.  Patient did not take any for symptoms.  Patient denies fever chest pain shortness of breath history abdominal surgery. CT abd didn't show acute pathology , admitted for GI evaluation and management of acute gastroenteritis .Found to have WBC elevation and ID cons requested His mother was diagnosed with Norovirus last week and was hospitalized. He has a mild fever 100.6 but no diarrhea at this time.   Abdominal CT was negative for any pathology at this time. He has mild leukocytosis that could be reactional to vomiting or dehydration.  # Vomiting  # Leukocytosis  # Norovirus contact last week  - Will follow labs and cultures   - Monitor WBC   - If diarrhea started will send GI PCR   - GI consult  - Will watch closely off antibiotics       Problem/Plan - 1:  ·  Problem: Gastroenteritis, acute.   ·  Plan: CT noted, d/w patient  Anti-emetics prn  PPI  Advance diet as tolerated  Urine tox r/o cannabinoid hyperemesis syndrome  Monitor for BM, order GI PCR if diarrhea.    Problem/Plan - 2:  ·  Problem: Exposure to virus.  ·  Plan: Patient's mother was diagnosed with Norovirus last week and was hospitalized. He has a mild fever 100.6 but no diarrhea at this time.   Abdominal CT was negative for any pathology at this time. He has mild leukocytosis that could be reactional to vomiting or dehydration.  # Vomiting  # Leukocytosis  # Norovirus contact last week  - Will follow labs and cultures   - Monitor WBC   - If diarrhea started will send GI PCR   - GI consult  - Will watch closely off antibiotics.    Problem/Plan - 3:  ·  Problem: IBS (irritable bowel syndrome).  ·  Plan: as per GI.    Problem/Plan - 4:  ·  Problem: Elevated WBCs.  ·  Plan: likely reactive 2/2 to dehydration and emesis.    Problem/Plan - 5:  ·  Problem: Cyclic vomiting syndrome.  ·  Plan: antiemetics ,ppi.    Problem/Plan - 6:  ·  Problem: Insomnia.  ·  Plan: melatonin.    Problem/Plan - 7:  ·  Problem: Asthma.  ·  Plan: monitor respiratory status.    Problem/Plan - 8:  ·  Problem: Chronic neck pain.  ·  Plan: pain management , prn.    Problem/Plan - 9:  ·  Problem: Anxiety.   ·  Plan: states that takes xanax that is being prescribed by MD in [facility].    Problem/Plan - 10:  ·  Problem: Marijuana use.   ·  Plan; last use 02/21 , patient states he was trying to alleviate Gi symptoms by using it but was not helpful.    Problem/Plan - 11:  ·  Problem: Prophylactic measure.   ·  Plan: Gastrointestinal stress ulcer prophylaxis and DVT prophylaxis administered.

## 2024-02-23 NOTE — DISCHARGE NOTE PROVIDER - NSDCCPCAREPLAN_GEN_ALL_CORE_FT
PRINCIPAL DISCHARGE DIAGNOSIS  Diagnosis: Gastroenteritis, acute  Assessment and Plan of Treatment: resolved      SECONDARY DISCHARGE DIAGNOSES  Diagnosis: Exposure to virus  Assessment and Plan of Treatment: norovirus    Diagnosis: IBS (irritable bowel syndrome)  Assessment and Plan of Treatment:     Diagnosis: Insomnia  Assessment and Plan of Treatment:     Diagnosis: Cyclic vomiting syndrome  Assessment and Plan of Treatment:     Diagnosis: Marijuana user  Assessment and Plan of Treatment:

## 2024-06-04 NOTE — ED CDU PROVIDER NOTE - CROS ED NEURO POS
hide
SENSORY DEFICITS/pain from neck radiates to head , all throughout posterior neck/upper shoulders and to b/l arms./HEADACHE

## 2025-05-12 NOTE — CARE COORDINATION ASSESSMENT. - LIVING ARRANGEMENTS/HOME SAFETY CONCERNS
Call Center TCM Note      Flowsheet Row Responses   [facility] patient discharged from? [place]   Does the patient have one of the following disease processes/diagnoses(primary or secondary)? Other   TCM attempt successful? No   Unsuccessful attempts Attempt 1   Call Status Left message            Ian Curtis Registered Nurse    5/12/2025, 08:23 EDT        
no concerns

## 2025-05-12 NOTE — ED CDU PROVIDER NOTE - CPE EDP EYES NORM
Preventive Care Visit  [facility] SHERYL Mchugh MD, Internal Medicine - Pediatrics  May 12, 2025      Assessment & Plan     (Z00.00) Routine general medical examination at a health care facility  (primary encounter diagnosis)    (F41.9) Anxiety  Comment:   Plan: Describes longstanding anxiety symptoms.  Main stressor is her significant other who has been diagnosed with a brain tumor, went through surgery and treatments-patient is primary caretaker.    Stress associated with work, also patient concerns regarding fatigue and limited time for self cares such as exercise secondary to duties as caretaker.  Concerns today regarding memory loss/worries about dementia.  MMSE completed today-normal.      (R63.5) Weight gain  Comment:   Plan: semaglutide-weight management (WEGOVY) 0.25         MG/0.5ML pen        Requests Wegovy for weight loss/states she has had little success with diet changes and exercise.  BMI-25, otherwise healthy weight/discussed.  No contraindications to Wegovy however discussed medication cost if not covered by insurance/medication side effects reviewed.    (R53.83) Other fatigue  Comment:   Plan: TSH with free T4 reflex, CBC with platelets and        differential, Ferritin, Adult Sleep Eval &         Management  Referral          Suspect fatigue primarily secondary to ongoing stressors.  Also check additional lab work today-TSH, blood counts, ferritin.  Describes poor sleep, also likely contributing-would like to see Sleep Medicine/referral.    (L70.9) Acne, unspecified acne type  Comment:   Plan: Clindamycin, tretinoin.  Takes cefuroxime as needed for acute flares    (Z13.220) Lipid screening  Comment:   Plan: Hemoglobin A1c, Comprehensive metabolic panel         (BMP + Alb, Alk Phos, ALT, AST, Total. Bili,         TP), Lipid panel reflex to direct LDL Fasting          (Z11.59) Need for hepatitis C screening test  Comment:   Plan: Hepatitis C Screen Reflex to HCV RNA  "Quant and         Genotype              BMI  Estimated body mass index is 25.44 kg/m  as calculated from the following:    Height as of this encounter: 1.651 m (5' 5\").    Weight as of this encounter: 69.4 kg (152 lb 14.4 oz).       Counseling  Appropriate preventive services were addressed with this patient via screening, questionnaire, or discussion as appropriate for fall prevention, nutrition, physical activity, Tobacco-use cessation, social engagement, weight loss and cognition.  Checklist reviewing preventive services available has been given to the patient.  Reviewed patient's diet, addressing concerns and/or questions.   She is at risk for lack of exercise and has been provided with information to increase physical activity for the benefit of her well-being.       Haresh Herndon is a 50 year old, presenting for the following:  Physical        5/12/2025     1:20 PM   Additional Questions   Roomed by Rachel   Accompanied by self         5/12/2025     1:20 PM   Patient Reported Additional Medications   Patient reports taking the following new medications no          HPI             Advance Care Planning            5/12/2025   General Health   How would you rate your overall physical health? (!) FAIR   Feel stress (tense, anxious, or unable to sleep) Not at all         5/12/2025   Nutrition   Three or more servings of calcium each day? Yes   Diet: Regular (no restrictions)   How many servings of fruit and vegetables per day? (!) 2-3   How many sweetened beverages each day? 0-1         5/12/2025   Exercise   Days per week of moderate/strenous exercise 1 day   Average minutes spent exercising at this level 30 min   (!) EXERCISE CONCERN      5/12/2025   Social Factors   Frequency of gathering with friends or relatives Once a week   Worry food won't last until get money to buy more No   Food not last or not have enough money for food? No   Do you have housing? (Housing is defined as stable permanent housing and " does not include staying outside in a car, in a tent, in an abandoned building, in an overnight shelter, or couch-surfing.) No   Are you worried about losing your housing? No   Lack of transportation? No   Unable to get utilities (heat,electricity)? No   Want help with housing or utility concern? No   (!) HOUSING CONCERN PRESENT      5/12/2025   Dental   Dentist two times every year? Yes         Today's PHQ-2 Score:       5/12/2025    10:38 AM   PHQ-2 ( 1999 [employer])   Q1: Little interest or pleasure in doing things 0   Q2: Feeling down, depressed or hopeless 0   PHQ-2 Score 0    Q1: Little interest or pleasure in doing things Not at all   Q2: Feeling down, depressed or hopeless Not at all   PHQ-2 Score 0       Patient-reported           5/12/2025   Substance Use   Alcohol more than 3/day or more than 7/wk No   Do you use any other substances recreationally? No     Social History     Tobacco Use    Smoking status: Never    Smokeless tobacco: Never   Vaping Use    Vaping status: Never Used   Substance Use Topics    Alcohol use: Yes     Comment: socially    Drug use: No           6/12/2024   LAST FHS-7 RESULTS   1st degree relative breast or ovarian cancer Yes   Any relative bilateral breast cancer No   Any male have breast cancer No   Any ONE woman have BOTH breast AND ovarian cancer No   Any woman with breast cancer before 50yrs Yes   2 or more relatives with breast AND/OR ovarian cancer No   2 or more relatives with breast AND/OR bowel cancer No        Mammogram Screening - Mammogram every 1-2 years updated in Health Maintenance based on mutual decision making          5/12/2025   One time HIV Screening   Previous HIV test? Yes         5/12/2025   STI Screening   New sexual partner(s) since last STI/HIV test? No     History of abnormal Pap smear:   pap smear 2021, following up with [facility]        7/21/2021    10:00 AM   PAP / HPV   PAP-ABSTRACT See Scanned Document      ASCVD Risk   The ASCVD Risk score  (Moni HAMILTON, et al., 2019) failed to calculate for the following reasons:    Cannot find a previous HDL lab    Cannot find a previous total cholesterol lab        5/12/2025   Contraception/Family Planning   Questions about contraception or family planning No        Reviewed and updated as needed this visit by Provider                    Patient Active Problem List   Diagnosis    Heterozygous factor V Leiden mutation    Anxiety    HSIL (high grade squamous intraepithelial lesion) on Pap smear of cervix    Left carpal tunnel syndrome    Facet arthropathy, cervical    DDD (degenerative disc disease), cervical    Acne, unspecified acne type     Past Surgical History:   Procedure Laterality Date    COSMETIC MAMMOPLASTY AUGMENTATION  2003    LEEP TX, CERVICAL  2020       Social History     Tobacco Use    Smoking status: Never    Smokeless tobacco: Never   Substance Use Topics    Alcohol use: Yes     Comment: socially     Family History   Problem Relation Age of Onset    Arrhythmia Father     Parkinsonism Father     Factor V Leiden deficiency Father     Janice Parkinson White syndrome Father     Breast Cancer Sister         under 50    Colon Cancer No family hx of     Ovarian Cancer No family hx of          Current Outpatient Medications   Medication Sig Dispense Refill    azelaic acid (FINACIA) 15 % external gel Apply topically daily. 50 g 3    bimatoprost (LATISSE) 0.03 % external opthalmic solution Apply 1 drop topically at bedtime.      buPROPion (WELLBUTRIN XL) 300 MG 24 hr tablet Take 300 mg by mouth every evening      cefuroxime (CEFTIN) 500 MG tablet Take 1 tablet (500 mg) by mouth 2 times daily. 60 tablet 5    clindamycin (CLEOCIN-T) 1 % external gel Apply topically daily. 30 g 3    fluticasone (FLONASE) 50 MCG/ACT nasal spray USE 2 SPRAYS INTO EACH NOSTRIL TWICE A DAY 96 mL 1    loratadine 10 MG capsule Take 10 mg by mouth daily 90 capsule 3    semaglutide-weight management (WEGOVY) 0.25 MG/0.5ML pen Inject 0.5 mLs  "(0.25 mg) subcutaneously once a week. 2 mL 0    tretinoin (RETIN-A) 0.05 % external cream Apply topically at bedtime. 20 g 0    valACYclovir (VALTREX) 500 MG tablet Take 1 tablet (500 mg) by mouth 2 times daily. 180 tablet 1         Review of Systems  Constitutional, neuro, ENT, endocrine, pulmonary, cardiac, gastrointestinal, genitourinary, musculoskeletal, integument and psychiatric systems are negative, except as otherwise noted.     Objective    Exam  /72 (BP Location: Right arm, Patient Position: Sitting, Cuff Size: Adult Regular)   Pulse 74   Temp 98.3  F (36.8  C) (Tympanic)   Resp 16   Ht 1.651 m (5' 5\")   Wt 69.4 kg (152 lb 14.4 oz)   LMP 05/11/2025   SpO2 100%   BMI 25.44 kg/m     Estimated body mass index is 25.44 kg/m  as calculated from the following:    Height as of this encounter: 1.651 m (5' 5\").    Weight as of this encounter: 69.4 kg (152 lb 14.4 oz).    Physical Exam  GENERAL: alert and no distress  EYES: Eyes grossly normal to inspection, PERRL and conjunctivae and sclerae normal  HENT: ear canals and TM's normal, nose and mouth without ulcers or lesions  NECK: no adenopathy, no asymmetry, masses, or scars  RESP: lungs clear to auscultation - no rales, rhonchi or wheezes  CV: regular rate and rhythm, normal S1 S2, no S3 or S4, no murmur, click or rub, no peripheral edema  ABDOMEN: soft, nontender, no hepatosplenomegaly, no masses and bowel sounds normal  MS: no gross musculoskeletal defects noted, no edema  SKIN: no suspicious lesions or rashes  NEURO: Normal strength and tone, mentation intact and speech normal  PSYCH: mentation appears normal, affect normal/bright        Signed Electronically by: Michael Mchugh MD    " normal...

## 2025-06-25 ENCOUNTER — INPATIENT (INPATIENT)
Facility: HOSPITAL | Age: 35
LOS: 2 days | Discharge: HOME | DRG: 392 | End: 2025-06-28
Attending: INTERNAL MEDICINE | Admitting: INTERNAL MEDICINE
Payer: MEDICAID

## 2025-06-25 VITALS
TEMPERATURE: 98 F | SYSTOLIC BLOOD PRESSURE: 130 MMHG | DIASTOLIC BLOOD PRESSURE: 92 MMHG | HEART RATE: 78 BPM | RESPIRATION RATE: 16 BRPM | WEIGHT: 160.06 LBS | OXYGEN SATURATION: 95 % | HEIGHT: 72 IN

## 2025-06-25 LAB
ALBUMIN SERPL ELPH-MCNC: 4.7 G/DL — SIGNIFICANT CHANGE UP (ref 3.3–5)
ALBUMIN SERPL ELPH-MCNC: 5.9 G/DL — HIGH (ref 3.3–5)
ALP SERPL-CCNC: 69 U/L — SIGNIFICANT CHANGE UP (ref 40–120)
ALP SERPL-CCNC: 94 U/L — SIGNIFICANT CHANGE UP (ref 40–120)
ALT FLD-CCNC: 35 U/L — SIGNIFICANT CHANGE UP (ref 12–78)
ALT FLD-CCNC: 38 U/L — SIGNIFICANT CHANGE UP (ref 12–78)
ANION GAP SERPL CALC-SCNC: 15 MMOL/L — SIGNIFICANT CHANGE UP (ref 5–17)
ANION GAP SERPL CALC-SCNC: 18 MMOL/L — HIGH (ref 5–17)
AST SERPL-CCNC: 29 U/L — SIGNIFICANT CHANGE UP (ref 15–37)
AST SERPL-CCNC: 29 U/L — SIGNIFICANT CHANGE UP (ref 15–37)
BASOPHILS # BLD AUTO: 0.07 K/UL — SIGNIFICANT CHANGE UP (ref 0–0.2)
BASOPHILS NFR BLD AUTO: 0.4 % — SIGNIFICANT CHANGE UP (ref 0–2)
BILIRUB SERPL-MCNC: 0.6 MG/DL — SIGNIFICANT CHANGE UP (ref 0.2–1.2)
BILIRUB SERPL-MCNC: 0.7 MG/DL — SIGNIFICANT CHANGE UP (ref 0.2–1.2)
BUN SERPL-MCNC: 23 MG/DL — SIGNIFICANT CHANGE UP (ref 7–23)
BUN SERPL-MCNC: 23 MG/DL — SIGNIFICANT CHANGE UP (ref 7–23)
CALCIUM SERPL-MCNC: 10.8 MG/DL — HIGH (ref 8.5–10.1)
CALCIUM SERPL-MCNC: 9.5 MG/DL — SIGNIFICANT CHANGE UP (ref 8.5–10.1)
CHLORIDE SERPL-SCNC: 105 MMOL/L — SIGNIFICANT CHANGE UP (ref 96–108)
CHLORIDE SERPL-SCNC: 98 MMOL/L — SIGNIFICANT CHANGE UP (ref 96–108)
CK SERPL-CCNC: 149 U/L — SIGNIFICANT CHANGE UP (ref 26–308)
CO2 SERPL-SCNC: 21 MMOL/L — LOW (ref 22–31)
CO2 SERPL-SCNC: 22 MMOL/L — SIGNIFICANT CHANGE UP (ref 22–31)
CREAT SERPL-MCNC: 2 MG/DL — HIGH (ref 0.5–1.3)
CREAT SERPL-MCNC: 2.6 MG/DL — HIGH (ref 0.5–1.3)
EGFR: 33 ML/MIN/1.73M2 — LOW
EGFR: 33 ML/MIN/1.73M2 — LOW
EGFR: 45 ML/MIN/1.73M2 — LOW
EGFR: 45 ML/MIN/1.73M2 — LOW
EOSINOPHIL # BLD AUTO: 0.01 K/UL — SIGNIFICANT CHANGE UP (ref 0–0.5)
EOSINOPHIL NFR BLD AUTO: 0.1 % — SIGNIFICANT CHANGE UP (ref 0–6)
GLUCOSE SERPL-MCNC: 142 MG/DL — HIGH (ref 70–99)
GLUCOSE SERPL-MCNC: 152 MG/DL — HIGH (ref 70–99)
HCT VFR BLD CALC: 53.8 % — HIGH (ref 39–50)
HGB BLD-MCNC: 19.2 G/DL — CRITICAL HIGH (ref 13–17)
IMM GRANULOCYTES # BLD AUTO: 0.11 K/UL — HIGH (ref 0–0.07)
IMM GRANULOCYTES NFR BLD AUTO: 0.6 % — SIGNIFICANT CHANGE UP (ref 0–0.9)
LIDOCAIN IGE QN: 23 U/L — SIGNIFICANT CHANGE UP (ref 13–75)
LYMPHOCYTES # BLD AUTO: 0.86 K/UL — LOW (ref 1–3.3)
LYMPHOCYTES NFR BLD AUTO: 5 % — LOW (ref 13–44)
MAGNESIUM SERPL-MCNC: 2.3 MG/DL — SIGNIFICANT CHANGE UP (ref 1.6–2.6)
MCHC RBC-ENTMCNC: 32 PG — SIGNIFICANT CHANGE UP (ref 27–34)
MCHC RBC-ENTMCNC: 35.7 G/DL — SIGNIFICANT CHANGE UP (ref 32–36)
MCV RBC AUTO: 89.7 FL — SIGNIFICANT CHANGE UP (ref 80–100)
MONOCYTES # BLD AUTO: 0.36 K/UL — SIGNIFICANT CHANGE UP (ref 0–0.9)
MONOCYTES NFR BLD AUTO: 2.1 % — SIGNIFICANT CHANGE UP (ref 2–14)
NEUTROPHILS # BLD AUTO: 15.95 K/UL — HIGH (ref 1.8–7.4)
NEUTROPHILS NFR BLD AUTO: 91.8 % — HIGH (ref 43–77)
NRBC # BLD AUTO: 0 K/UL — SIGNIFICANT CHANGE UP (ref 0–0)
NRBC # FLD: 0 K/UL — SIGNIFICANT CHANGE UP (ref 0–0)
NRBC BLD AUTO-RTO: 0 /100 WBCS — SIGNIFICANT CHANGE UP (ref 0–0)
PLATELET # BLD AUTO: 290 K/UL — SIGNIFICANT CHANGE UP (ref 150–400)
PMV BLD: 8.8 FL — SIGNIFICANT CHANGE UP (ref 7–13)
POTASSIUM SERPL-MCNC: 3.7 MMOL/L — SIGNIFICANT CHANGE UP (ref 3.5–5.3)
POTASSIUM SERPL-MCNC: 3.7 MMOL/L — SIGNIFICANT CHANGE UP (ref 3.5–5.3)
POTASSIUM SERPL-SCNC: 3.7 MMOL/L — SIGNIFICANT CHANGE UP (ref 3.5–5.3)
POTASSIUM SERPL-SCNC: 3.7 MMOL/L — SIGNIFICANT CHANGE UP (ref 3.5–5.3)
PROT SERPL-MCNC: 10.4 G/DL — HIGH (ref 6–8.3)
PROT SERPL-MCNC: 7.7 G/DL — SIGNIFICANT CHANGE UP (ref 6–8.3)
RBC # BLD: 6 M/UL — HIGH (ref 4.2–5.8)
RBC # FLD: 11.4 % — SIGNIFICANT CHANGE UP (ref 10.3–14.5)
SODIUM SERPL-SCNC: 138 MMOL/L — SIGNIFICANT CHANGE UP (ref 135–145)
SODIUM SERPL-SCNC: 141 MMOL/L — SIGNIFICANT CHANGE UP (ref 135–145)
WBC # BLD: 17.36 K/UL — HIGH (ref 3.8–10.5)
WBC # FLD AUTO: 17.36 K/UL — HIGH (ref 3.8–10.5)

## 2025-06-25 PROCEDURE — 99285 EMERGENCY DEPT VISIT HI MDM: CPT

## 2025-06-25 RX ORDER — ONDANSETRON HCL/PF 4 MG/2 ML
4 VIAL (ML) INJECTION ONCE
Refills: 0 | Status: COMPLETED | OUTPATIENT
Start: 2025-06-25 | End: 2025-06-25

## 2025-06-25 RX ORDER — DIAZEPAM 5 MG/1
5 TABLET ORAL ONCE
Refills: 0 | Status: DISCONTINUED | OUTPATIENT
Start: 2025-06-25 | End: 2025-06-25

## 2025-06-25 RX ORDER — ACETAMINOPHEN 500 MG/5ML
1000 LIQUID (ML) ORAL ONCE
Refills: 0 | Status: COMPLETED | OUTPATIENT
Start: 2025-06-25 | End: 2025-06-25

## 2025-06-25 RX ADMIN — Medication 4 MILLIGRAM(S): at 23:40

## 2025-06-25 RX ADMIN — Medication 1000 MILLILITER(S): at 20:33

## 2025-06-25 RX ADMIN — DIAZEPAM 5 MILLIGRAM(S): 5 TABLET ORAL at 22:22

## 2025-06-25 RX ADMIN — Medication 4 MILLIGRAM(S): at 20:32

## 2025-06-25 RX ADMIN — Medication 40 MILLIGRAM(S): at 21:20

## 2025-06-25 RX ADMIN — Medication 1000 MILLILITER(S): at 23:39

## 2025-06-25 RX ADMIN — Medication 400 MILLIGRAM(S): at 20:33

## 2025-06-26 DIAGNOSIS — D72.829 ELEVATED WHITE BLOOD CELL COUNT, UNSPECIFIED: ICD-10-CM

## 2025-06-26 DIAGNOSIS — R11.10 VOMITING, UNSPECIFIED: ICD-10-CM

## 2025-06-26 DIAGNOSIS — R79.89 OTHER SPECIFIED ABNORMAL FINDINGS OF BLOOD CHEMISTRY: ICD-10-CM

## 2025-06-26 DIAGNOSIS — R93.3 ABNORMAL FINDINGS ON DIAGNOSTIC IMAGING OF OTHER PARTS OF DIGESTIVE TRACT: ICD-10-CM

## 2025-06-26 DIAGNOSIS — G47.00 INSOMNIA, UNSPECIFIED: ICD-10-CM

## 2025-06-26 DIAGNOSIS — R11.2 NAUSEA WITH VOMITING, UNSPECIFIED: ICD-10-CM

## 2025-06-26 DIAGNOSIS — T67.01XA HEATSTROKE AND SUNSTROKE, INITIAL ENCOUNTER: ICD-10-CM

## 2025-06-26 DIAGNOSIS — Z29.9 ENCOUNTER FOR PROPHYLACTIC MEASURES, UNSPECIFIED: ICD-10-CM

## 2025-06-26 DIAGNOSIS — N17.9 ACUTE KIDNEY FAILURE, UNSPECIFIED: ICD-10-CM

## 2025-06-26 DIAGNOSIS — E86.0 DEHYDRATION: ICD-10-CM

## 2025-06-26 PROBLEM — F41.9 ANXIETY DISORDER, UNSPECIFIED: Chronic | Status: ACTIVE | Noted: 2024-02-22

## 2025-06-26 PROBLEM — F12.90 CANNABIS USE, UNSPECIFIED, UNCOMPLICATED: Chronic | Status: ACTIVE | Noted: 2024-02-22

## 2025-06-26 PROBLEM — K58.9 IRRITABLE BOWEL SYNDROME, UNSPECIFIED: Chronic | Status: ACTIVE | Noted: 2024-02-22

## 2025-06-26 LAB
ANION GAP SERPL CALC-SCNC: 12 MMOL/L — SIGNIFICANT CHANGE UP (ref 5–17)
APPEARANCE UR: CLEAR — SIGNIFICANT CHANGE UP
APPEARANCE UR: CLEAR — SIGNIFICANT CHANGE UP
BACTERIA # UR AUTO: ABNORMAL /HPF
BASOPHILS # BLD AUTO: 0.03 K/UL — SIGNIFICANT CHANGE UP (ref 0–0.2)
BASOPHILS # BLD AUTO: 0.07 K/UL — SIGNIFICANT CHANGE UP (ref 0–0.2)
BASOPHILS NFR BLD AUTO: 0.2 % — SIGNIFICANT CHANGE UP (ref 0–2)
BASOPHILS NFR BLD AUTO: 0.3 % — SIGNIFICANT CHANGE UP (ref 0–2)
BILIRUB UR-MCNC: NEGATIVE — SIGNIFICANT CHANGE UP
BILIRUB UR-MCNC: NEGATIVE — SIGNIFICANT CHANGE UP
BUN SERPL-MCNC: 19 MG/DL — SIGNIFICANT CHANGE UP (ref 7–23)
CALCIUM SERPL-MCNC: 9.2 MG/DL — SIGNIFICANT CHANGE UP (ref 8.5–10.1)
CHLORIDE SERPL-SCNC: 108 MMOL/L — SIGNIFICANT CHANGE UP (ref 96–108)
CO2 SERPL-SCNC: 20 MMOL/L — LOW (ref 22–31)
COLOR SPEC: YELLOW — SIGNIFICANT CHANGE UP
COLOR SPEC: YELLOW — SIGNIFICANT CHANGE UP
CREAT SERPL-MCNC: 1.4 MG/DL — HIGH (ref 0.5–1.3)
DIFF PNL FLD: ABNORMAL
DIFF PNL FLD: NEGATIVE — SIGNIFICANT CHANGE UP
EGFR: 68 ML/MIN/1.73M2 — SIGNIFICANT CHANGE UP
EGFR: 68 ML/MIN/1.73M2 — SIGNIFICANT CHANGE UP
EOSINOPHIL # BLD AUTO: 0 K/UL — SIGNIFICANT CHANGE UP (ref 0–0.5)
EOSINOPHIL # BLD AUTO: 10.22 K/UL — HIGH (ref 0–0.5)
EOSINOPHIL NFR BLD AUTO: 0 % — SIGNIFICANT CHANGE UP (ref 0–6)
EOSINOPHIL NFR BLD AUTO: 46.9 % — HIGH (ref 0–6)
EPI CELLS # UR: PRESENT
GLUCOSE SERPL-MCNC: 130 MG/DL — HIGH (ref 70–99)
GLUCOSE UR QL: 500 MG/DL
GLUCOSE UR QL: NEGATIVE MG/DL — SIGNIFICANT CHANGE UP
HCT VFR BLD CALC: 47 % — SIGNIFICANT CHANGE UP (ref 39–50)
HCT VFR BLD CALC: 47.7 % — SIGNIFICANT CHANGE UP (ref 39–50)
HGB BLD-MCNC: 17.1 G/DL — HIGH (ref 13–17)
HGB BLD-MCNC: 17.4 G/DL — HIGH (ref 13–17)
IMM GRANULOCYTES # BLD AUTO: 0.1 K/UL — HIGH (ref 0–0.07)
IMM GRANULOCYTES # BLD AUTO: 0.14 K/UL — HIGH (ref 0–0.07)
IMM GRANULOCYTES NFR BLD AUTO: 0.6 % — SIGNIFICANT CHANGE UP (ref 0–0.9)
IMM GRANULOCYTES NFR BLD AUTO: 0.7 % — SIGNIFICANT CHANGE UP (ref 0–0.9)
KETONES UR QL: 40 MG/DL
KETONES UR QL: ABNORMAL MG/DL
LACTATE SERPL-SCNC: 1.8 MMOL/L — SIGNIFICANT CHANGE UP (ref 0.7–2)
LACTATE SERPL-SCNC: 2.9 MMOL/L — HIGH (ref 0.7–2)
LACTATE SERPL-SCNC: 3.6 MMOL/L — HIGH (ref 0.7–2)
LEUKOCYTE ESTERASE UR-ACNC: NEGATIVE — SIGNIFICANT CHANGE UP
LEUKOCYTE ESTERASE UR-ACNC: NEGATIVE — SIGNIFICANT CHANGE UP
LYMPHOCYTES # BLD AUTO: 0.79 K/UL — LOW (ref 1–3.3)
LYMPHOCYTES # BLD AUTO: 1.18 K/UL — SIGNIFICANT CHANGE UP (ref 1–3.3)
LYMPHOCYTES NFR BLD AUTO: 3.6 % — LOW (ref 13–44)
LYMPHOCYTES NFR BLD AUTO: 8.2 % — LOW (ref 13–44)
MCHC RBC-ENTMCNC: 32.3 PG — SIGNIFICANT CHANGE UP (ref 27–34)
MCHC RBC-ENTMCNC: 32.3 PG — SIGNIFICANT CHANGE UP (ref 27–34)
MCHC RBC-ENTMCNC: 36.4 G/DL — HIGH (ref 32–36)
MCHC RBC-ENTMCNC: 36.5 G/DL — HIGH (ref 32–36)
MCV RBC AUTO: 88.7 FL — SIGNIFICANT CHANGE UP (ref 80–100)
MCV RBC AUTO: 88.8 FL — SIGNIFICANT CHANGE UP (ref 80–100)
MONOCYTES # BLD AUTO: 0.76 K/UL — SIGNIFICANT CHANGE UP (ref 0–0.9)
MONOCYTES # BLD AUTO: 0.86 K/UL — SIGNIFICANT CHANGE UP (ref 0–0.9)
MONOCYTES NFR BLD AUTO: 3.9 % — SIGNIFICANT CHANGE UP (ref 2–14)
MONOCYTES NFR BLD AUTO: 5.3 % — SIGNIFICANT CHANGE UP (ref 2–14)
NEUTROPHILS # BLD AUTO: 12.26 K/UL — HIGH (ref 1.8–7.4)
NEUTROPHILS # BLD AUTO: 9.73 K/UL — HIGH (ref 1.8–7.4)
NEUTROPHILS NFR BLD AUTO: 44.7 % — SIGNIFICANT CHANGE UP (ref 43–77)
NEUTROPHILS NFR BLD AUTO: 85.6 % — HIGH (ref 43–77)
NITRITE UR-MCNC: NEGATIVE — SIGNIFICANT CHANGE UP
NITRITE UR-MCNC: NEGATIVE — SIGNIFICANT CHANGE UP
NRBC # BLD AUTO: 0 K/UL — SIGNIFICANT CHANGE UP (ref 0–0)
NRBC # BLD AUTO: 0 K/UL — SIGNIFICANT CHANGE UP (ref 0–0)
NRBC # FLD: 0 K/UL — SIGNIFICANT CHANGE UP (ref 0–0)
NRBC # FLD: 0 K/UL — SIGNIFICANT CHANGE UP (ref 0–0)
NRBC BLD AUTO-RTO: 0 /100 WBCS — SIGNIFICANT CHANGE UP (ref 0–0)
NRBC BLD AUTO-RTO: 0 /100 WBCS — SIGNIFICANT CHANGE UP (ref 0–0)
PCP SPEC-MCNC: SIGNIFICANT CHANGE UP
PH UR: 5.5 — SIGNIFICANT CHANGE UP (ref 5–8)
PH UR: 6.5 — SIGNIFICANT CHANGE UP (ref 5–8)
PLATELET # BLD AUTO: 230 K/UL — SIGNIFICANT CHANGE UP (ref 150–400)
PLATELET # BLD AUTO: 255 K/UL — SIGNIFICANT CHANGE UP (ref 150–400)
PMV BLD: 9.1 FL — SIGNIFICANT CHANGE UP (ref 7–13)
PMV BLD: 9.3 FL — SIGNIFICANT CHANGE UP (ref 7–13)
POTASSIUM SERPL-MCNC: 4.3 MMOL/L — SIGNIFICANT CHANGE UP (ref 3.5–5.3)
POTASSIUM SERPL-SCNC: 4.3 MMOL/L — SIGNIFICANT CHANGE UP (ref 3.5–5.3)
PROT UR-MCNC: 30 MG/DL
PROT UR-MCNC: 30 MG/DL
RBC # BLD: 5.29 M/UL — SIGNIFICANT CHANGE UP (ref 4.2–5.8)
RBC # BLD: 5.38 M/UL — SIGNIFICANT CHANGE UP (ref 4.2–5.8)
RBC # FLD: 11.7 % — SIGNIFICANT CHANGE UP (ref 10.3–14.5)
RBC # FLD: 11.7 % — SIGNIFICANT CHANGE UP (ref 10.3–14.5)
RBC CASTS # UR COMP ASSIST: 9 /HPF — HIGH (ref 0–4)
SODIUM SERPL-SCNC: 140 MMOL/L — SIGNIFICANT CHANGE UP (ref 135–145)
SP GR SPEC: 1.02 — SIGNIFICANT CHANGE UP (ref 1–1.03)
SP GR SPEC: 1.03 — SIGNIFICANT CHANGE UP (ref 1–1.03)
UROBILINOGEN FLD QL: 0.2 MG/DL — SIGNIFICANT CHANGE UP (ref 0.2–1)
UROBILINOGEN FLD QL: 0.2 MG/DL — SIGNIFICANT CHANGE UP (ref 0.2–1)
VIT B12 SERPL-MCNC: 384 PG/ML — SIGNIFICANT CHANGE UP (ref 232–1245)
WBC # BLD: 14.33 K/UL — HIGH (ref 3.8–10.5)
WBC # BLD: 21.81 K/UL — HIGH (ref 3.8–10.5)
WBC # FLD AUTO: 14.33 K/UL — HIGH (ref 3.8–10.5)
WBC # FLD AUTO: 21.81 K/UL — HIGH (ref 3.8–10.5)
WBC UR QL: 2 /HPF — SIGNIFICANT CHANGE UP (ref 0–5)

## 2025-06-26 PROCEDURE — 82550 ASSAY OF CK (CPK): CPT

## 2025-06-26 PROCEDURE — 74176 CT ABD & PELVIS W/O CONTRAST: CPT | Mod: 26

## 2025-06-26 PROCEDURE — 80048 BASIC METABOLIC PNL TOTAL CA: CPT

## 2025-06-26 PROCEDURE — 83605 ASSAY OF LACTIC ACID: CPT

## 2025-06-26 PROCEDURE — 71045 X-RAY EXAM CHEST 1 VIEW: CPT

## 2025-06-26 PROCEDURE — 36415 COLL VENOUS BLD VENIPUNCTURE: CPT

## 2025-06-26 PROCEDURE — 83735 ASSAY OF MAGNESIUM: CPT

## 2025-06-26 PROCEDURE — 93010 ELECTROCARDIOGRAM REPORT: CPT

## 2025-06-26 PROCEDURE — 80053 COMPREHEN METABOLIC PANEL: CPT

## 2025-06-26 PROCEDURE — 93005 ELECTROCARDIOGRAM TRACING: CPT

## 2025-06-26 PROCEDURE — 85025 COMPLETE CBC W/AUTO DIFF WBC: CPT

## 2025-06-26 PROCEDURE — 80307 DRUG TEST PRSMV CHEM ANLYZR: CPT

## 2025-06-26 PROCEDURE — 83690 ASSAY OF LIPASE: CPT

## 2025-06-26 PROCEDURE — 74176 CT ABD & PELVIS W/O CONTRAST: CPT

## 2025-06-26 PROCEDURE — 87040 BLOOD CULTURE FOR BACTERIA: CPT

## 2025-06-26 PROCEDURE — 71045 X-RAY EXAM CHEST 1 VIEW: CPT | Mod: 26

## 2025-06-26 PROCEDURE — 82607 VITAMIN B-12: CPT

## 2025-06-26 PROCEDURE — 81001 URINALYSIS AUTO W/SCOPE: CPT

## 2025-06-26 RX ORDER — DIAZEPAM 5 MG/1
1 TABLET ORAL EVERY 12 HOURS
Refills: 0 | Status: DISCONTINUED | OUTPATIENT
Start: 2025-06-26 | End: 2025-06-26

## 2025-06-26 RX ORDER — ACETAMINOPHEN 500 MG/5ML
650 LIQUID (ML) ORAL EVERY 6 HOURS
Refills: 0 | Status: DISCONTINUED | OUTPATIENT
Start: 2025-06-26 | End: 2025-06-28

## 2025-06-26 RX ORDER — DIAZEPAM 5 MG/1
1 TABLET ORAL
Refills: 0 | DISCHARGE

## 2025-06-26 RX ORDER — ONDANSETRON HCL/PF 4 MG/2 ML
4 VIAL (ML) INJECTION EVERY 8 HOURS
Refills: 0 | Status: DISCONTINUED | OUTPATIENT
Start: 2025-06-26 | End: 2025-06-28

## 2025-06-26 RX ORDER — HEPARIN SODIUM 1000 [USP'U]/ML
5000 INJECTION INTRAVENOUS; SUBCUTANEOUS EVERY 12 HOURS
Refills: 0 | Status: DISCONTINUED | OUTPATIENT
Start: 2025-06-26 | End: 2025-06-28

## 2025-06-26 RX ORDER — IOHEXOL 350 MG/ML
30 INJECTION, SOLUTION INTRAVENOUS ONCE
Refills: 0 | Status: DISCONTINUED | OUTPATIENT
Start: 2025-06-26 | End: 2025-06-26

## 2025-06-26 RX ORDER — PIPERACILLIN-TAZO-DEXTROSE,ISO 3.375G/5
3.38 IV SOLUTION, PIGGYBACK PREMIX FROZEN(ML) INTRAVENOUS ONCE
Refills: 0 | Status: COMPLETED | OUTPATIENT
Start: 2025-06-26 | End: 2025-06-26

## 2025-06-26 RX ORDER — LACTOBACILLUS ACIDOPHILUS/PECT 75 MM-100
1 CAPSULE ORAL EVERY 12 HOURS
Refills: 0 | Status: DISCONTINUED | OUTPATIENT
Start: 2025-06-26 | End: 2025-06-28

## 2025-06-26 RX ORDER — ACETAMINOPHEN 500 MG/5ML
1000 LIQUID (ML) ORAL ONCE
Refills: 0 | Status: COMPLETED | OUTPATIENT
Start: 2025-06-26 | End: 2025-06-26

## 2025-06-26 RX ORDER — MELATONIN 5 MG
5 TABLET ORAL AT BEDTIME
Refills: 0 | Status: DISCONTINUED | OUTPATIENT
Start: 2025-06-26 | End: 2025-06-28

## 2025-06-26 RX ORDER — PIPERACILLIN-TAZO-DEXTROSE,ISO 3.375G/5
3.38 IV SOLUTION, PIGGYBACK PREMIX FROZEN(ML) INTRAVENOUS EVERY 8 HOURS
Refills: 0 | Status: DISCONTINUED | OUTPATIENT
Start: 2025-06-26 | End: 2025-06-28

## 2025-06-26 RX ORDER — ACETAMINOPHEN 500 MG/5ML
1000 LIQUID (ML) ORAL ONCE
Refills: 0 | Status: DISCONTINUED | OUTPATIENT
Start: 2025-06-26 | End: 2025-06-28

## 2025-06-26 RX ORDER — ONDANSETRON HCL/PF 4 MG/2 ML
4 VIAL (ML) INJECTION DAILY
Refills: 0 | Status: DISCONTINUED | OUTPATIENT
Start: 2025-06-26 | End: 2025-06-26

## 2025-06-26 RX ORDER — IOHEXOL 350 MG/ML
30 INJECTION, SOLUTION INTRAVENOUS ONCE
Refills: 0 | Status: COMPLETED | OUTPATIENT
Start: 2025-06-26 | End: 2025-06-27

## 2025-06-26 RX ORDER — DIAZEPAM 5 MG/1
5 TABLET ORAL AT BEDTIME
Refills: 0 | Status: DISCONTINUED | OUTPATIENT
Start: 2025-06-26 | End: 2025-06-28

## 2025-06-26 RX ORDER — MAGNESIUM, ALUMINUM HYDROXIDE 200-200 MG
30 TABLET,CHEWABLE ORAL EVERY 6 HOURS
Refills: 0 | Status: DISCONTINUED | OUTPATIENT
Start: 2025-06-26 | End: 2025-06-28

## 2025-06-26 RX ADMIN — Medication 500 MILLILITER(S): at 10:58

## 2025-06-26 RX ADMIN — HEPARIN SODIUM 5000 UNIT(S): 1000 INJECTION INTRAVENOUS; SUBCUTANEOUS at 05:52

## 2025-06-26 RX ADMIN — Medication 1 TABLET(S): at 17:02

## 2025-06-26 RX ADMIN — Medication 400 MILLIGRAM(S): at 08:08

## 2025-06-26 RX ADMIN — Medication 25 GRAM(S): at 22:10

## 2025-06-26 RX ADMIN — Medication 40 MILLIGRAM(S): at 11:04

## 2025-06-26 RX ADMIN — DIAZEPAM 1 MILLIGRAM(S): 5 TABLET ORAL at 10:58

## 2025-06-26 RX ADMIN — Medication 4 MILLIGRAM(S): at 05:51

## 2025-06-26 RX ADMIN — Medication 25 GRAM(S): at 08:53

## 2025-06-26 RX ADMIN — Medication 200 MILLIGRAM(S): at 08:53

## 2025-06-26 RX ADMIN — Medication 25 GRAM(S): at 13:04

## 2025-06-26 RX ADMIN — Medication 30 MILLILITER(S): at 11:42

## 2025-06-26 RX ADMIN — HEPARIN SODIUM 5000 UNIT(S): 1000 INJECTION INTRAVENOUS; SUBCUTANEOUS at 17:02

## 2025-06-26 RX ADMIN — Medication 125 MILLILITER(S): at 03:31

## 2025-06-26 RX ADMIN — Medication 200 GRAM(S): at 03:22

## 2025-06-27 DIAGNOSIS — E83.39 OTHER DISORDERS OF PHOSPHORUS METABOLISM: ICD-10-CM

## 2025-06-27 LAB
ALBUMIN SERPL ELPH-MCNC: 3.4 G/DL — SIGNIFICANT CHANGE UP (ref 3.3–5)
ALP SERPL-CCNC: 40 U/L — SIGNIFICANT CHANGE UP (ref 40–120)
ALT FLD-CCNC: 28 U/L — SIGNIFICANT CHANGE UP (ref 12–78)
ANION GAP SERPL CALC-SCNC: 6 MMOL/L — SIGNIFICANT CHANGE UP (ref 5–17)
AST SERPL-CCNC: 27 U/L — SIGNIFICANT CHANGE UP (ref 15–37)
BASOPHILS # BLD AUTO: 0.05 K/UL — SIGNIFICANT CHANGE UP (ref 0–0.2)
BASOPHILS NFR BLD AUTO: 0.4 % — SIGNIFICANT CHANGE UP (ref 0–2)
BILIRUB SERPL-MCNC: 0.9 MG/DL — SIGNIFICANT CHANGE UP (ref 0.2–1.2)
BUN SERPL-MCNC: 13 MG/DL — SIGNIFICANT CHANGE UP (ref 7–23)
CALCIUM SERPL-MCNC: 8.1 MG/DL — LOW (ref 8.5–10.1)
CHLORIDE SERPL-SCNC: 109 MMOL/L — HIGH (ref 96–108)
CO2 SERPL-SCNC: 26 MMOL/L — SIGNIFICANT CHANGE UP (ref 22–31)
CREAT ?TM UR-MCNC: 164 MG/DL — SIGNIFICANT CHANGE UP
CREAT SERPL-MCNC: 0.91 MG/DL — SIGNIFICANT CHANGE UP (ref 0.5–1.3)
EGFR: 115 ML/MIN/1.73M2 — SIGNIFICANT CHANGE UP
EGFR: 115 ML/MIN/1.73M2 — SIGNIFICANT CHANGE UP
EOSINOPHIL # BLD AUTO: 0.08 K/UL — SIGNIFICANT CHANGE UP (ref 0–0.5)
EOSINOPHIL NFR BLD AUTO: 0.6 % — SIGNIFICANT CHANGE UP (ref 0–6)
GLUCOSE SERPL-MCNC: 97 MG/DL — SIGNIFICANT CHANGE UP (ref 70–99)
HCT VFR BLD CALC: 37.9 % — LOW (ref 39–50)
HGB BLD-MCNC: 13.2 G/DL — SIGNIFICANT CHANGE UP (ref 13–17)
IMM GRANULOCYTES # BLD AUTO: 0.07 K/UL — SIGNIFICANT CHANGE UP (ref 0–0.07)
IMM GRANULOCYTES NFR BLD AUTO: 0.6 % — SIGNIFICANT CHANGE UP (ref 0–0.9)
LYMPHOCYTES # BLD AUTO: 3.66 K/UL — HIGH (ref 1–3.3)
LYMPHOCYTES NFR BLD AUTO: 29.7 % — SIGNIFICANT CHANGE UP (ref 13–44)
MAGNESIUM SERPL-MCNC: 2.2 MG/DL — SIGNIFICANT CHANGE UP (ref 1.6–2.6)
MCHC RBC-ENTMCNC: 32.5 PG — SIGNIFICANT CHANGE UP (ref 27–34)
MCHC RBC-ENTMCNC: 34.8 G/DL — SIGNIFICANT CHANGE UP (ref 32–36)
MCV RBC AUTO: 93.3 FL — SIGNIFICANT CHANGE UP (ref 80–100)
MONOCYTES # BLD AUTO: 1.16 K/UL — HIGH (ref 0–0.9)
MONOCYTES NFR BLD AUTO: 9.4 % — SIGNIFICANT CHANGE UP (ref 2–14)
NEUTROPHILS # BLD AUTO: 7.29 K/UL — SIGNIFICANT CHANGE UP (ref 1.8–7.4)
NEUTROPHILS NFR BLD AUTO: 59.3 % — SIGNIFICANT CHANGE UP (ref 43–77)
NRBC # BLD AUTO: 0 K/UL — SIGNIFICANT CHANGE UP (ref 0–0)
NRBC # FLD: 0 K/UL — SIGNIFICANT CHANGE UP (ref 0–0)
NRBC BLD AUTO-RTO: 0 /100 WBCS — SIGNIFICANT CHANGE UP (ref 0–0)
PHOSPHATE SERPL-MCNC: 2.2 MG/DL — LOW (ref 2.5–4.5)
PLATELET # BLD AUTO: 160 K/UL — SIGNIFICANT CHANGE UP (ref 150–400)
PMV BLD: 8.9 FL — SIGNIFICANT CHANGE UP (ref 7–13)
POTASSIUM SERPL-MCNC: 3.7 MMOL/L — SIGNIFICANT CHANGE UP (ref 3.5–5.3)
POTASSIUM SERPL-SCNC: 3.7 MMOL/L — SIGNIFICANT CHANGE UP (ref 3.5–5.3)
PROCALCITONIN SERPL-MCNC: 0.07 NG/ML — SIGNIFICANT CHANGE UP
PROT ?TM UR-MCNC: 36 MG/DL — HIGH (ref 0–12)
PROT SERPL-MCNC: 5.5 G/DL — LOW (ref 6–8.3)
PROT/CREAT UR-RTO: 0.2 RATIO — SIGNIFICANT CHANGE UP (ref 0–0.2)
RBC # BLD: 4.06 M/UL — LOW (ref 4.2–5.8)
RBC # FLD: 11.9 % — SIGNIFICANT CHANGE UP (ref 10.3–14.5)
SODIUM SERPL-SCNC: 141 MMOL/L — SIGNIFICANT CHANGE UP (ref 135–145)
SODIUM UR-SCNC: 85 MMOL/L — SIGNIFICANT CHANGE UP
UUN UR-MCNC: 1432 MG/DL — SIGNIFICANT CHANGE UP
WBC # BLD: 12.31 K/UL — HIGH (ref 3.8–10.5)
WBC # FLD AUTO: 12.31 K/UL — HIGH (ref 3.8–10.5)

## 2025-06-27 PROCEDURE — 84156 ASSAY OF PROTEIN URINE: CPT

## 2025-06-27 PROCEDURE — 74176 CT ABD & PELVIS W/O CONTRAST: CPT

## 2025-06-27 PROCEDURE — 84540 ASSAY OF URINE/UREA-N: CPT

## 2025-06-27 PROCEDURE — 83690 ASSAY OF LIPASE: CPT

## 2025-06-27 PROCEDURE — 84145 PROCALCITONIN (PCT): CPT

## 2025-06-27 PROCEDURE — 74177 CT ABD & PELVIS W/CONTRAST: CPT

## 2025-06-27 PROCEDURE — 81001 URINALYSIS AUTO W/SCOPE: CPT

## 2025-06-27 PROCEDURE — 82607 VITAMIN B-12: CPT

## 2025-06-27 PROCEDURE — 80053 COMPREHEN METABOLIC PANEL: CPT

## 2025-06-27 PROCEDURE — 74177 CT ABD & PELVIS W/CONTRAST: CPT | Mod: 26

## 2025-06-27 PROCEDURE — 80307 DRUG TEST PRSMV CHEM ANLYZR: CPT

## 2025-06-27 PROCEDURE — 84100 ASSAY OF PHOSPHORUS: CPT

## 2025-06-27 PROCEDURE — 84300 ASSAY OF URINE SODIUM: CPT

## 2025-06-27 PROCEDURE — 83605 ASSAY OF LACTIC ACID: CPT

## 2025-06-27 PROCEDURE — 83735 ASSAY OF MAGNESIUM: CPT

## 2025-06-27 PROCEDURE — 93005 ELECTROCARDIOGRAM TRACING: CPT

## 2025-06-27 PROCEDURE — 82550 ASSAY OF CK (CPK): CPT

## 2025-06-27 PROCEDURE — 82570 ASSAY OF URINE CREATININE: CPT

## 2025-06-27 PROCEDURE — 71045 X-RAY EXAM CHEST 1 VIEW: CPT

## 2025-06-27 PROCEDURE — 85025 COMPLETE CBC W/AUTO DIFF WBC: CPT

## 2025-06-27 PROCEDURE — 87040 BLOOD CULTURE FOR BACTERIA: CPT

## 2025-06-27 PROCEDURE — 36415 COLL VENOUS BLD VENIPUNCTURE: CPT

## 2025-06-27 PROCEDURE — 80048 BASIC METABOLIC PNL TOTAL CA: CPT

## 2025-06-27 RX ORDER — SODIUM PHOSPHATE,DIBASIC DIHYD
15 POWDER (GRAM) MISCELLANEOUS ONCE
Refills: 0 | Status: COMPLETED | OUTPATIENT
Start: 2025-06-27 | End: 2025-06-27

## 2025-06-27 RX ADMIN — Medication 25 GRAM(S): at 21:53

## 2025-06-27 RX ADMIN — Medication 125 MILLILITER(S): at 16:28

## 2025-06-27 RX ADMIN — Medication 63.75 MILLIMOLE(S): at 14:13

## 2025-06-27 RX ADMIN — Medication 125 MILLILITER(S): at 01:26

## 2025-06-27 RX ADMIN — IOHEXOL 30 MILLILITER(S): 350 INJECTION, SOLUTION INTRAVENOUS at 10:33

## 2025-06-27 RX ADMIN — Medication 25 GRAM(S): at 05:32

## 2025-06-27 RX ADMIN — Medication 1 TABLET(S): at 17:26

## 2025-06-27 RX ADMIN — Medication 40 MILLIGRAM(S): at 11:55

## 2025-06-27 RX ADMIN — Medication 25 GRAM(S): at 14:04

## 2025-06-28 ENCOUNTER — TRANSCRIPTION ENCOUNTER (OUTPATIENT)
Age: 35
End: 2025-06-28

## 2025-06-28 VITALS
TEMPERATURE: 98 F | SYSTOLIC BLOOD PRESSURE: 123 MMHG | HEART RATE: 58 BPM | OXYGEN SATURATION: 99 % | DIASTOLIC BLOOD PRESSURE: 75 MMHG | RESPIRATION RATE: 18 BRPM

## 2025-06-28 LAB
ANION GAP SERPL CALC-SCNC: 6 MMOL/L — SIGNIFICANT CHANGE UP (ref 5–17)
BUN SERPL-MCNC: 8 MG/DL — SIGNIFICANT CHANGE UP (ref 7–23)
CALCIUM SERPL-MCNC: 8.3 MG/DL — LOW (ref 8.5–10.1)
CHLORIDE SERPL-SCNC: 110 MMOL/L — HIGH (ref 96–108)
CO2 SERPL-SCNC: 27 MMOL/L — SIGNIFICANT CHANGE UP (ref 22–31)
CREAT SERPL-MCNC: 0.9 MG/DL — SIGNIFICANT CHANGE UP (ref 0.5–1.3)
EGFR: 116 ML/MIN/1.73M2 — SIGNIFICANT CHANGE UP
EGFR: 116 ML/MIN/1.73M2 — SIGNIFICANT CHANGE UP
GLUCOSE SERPL-MCNC: 91 MG/DL — SIGNIFICANT CHANGE UP (ref 70–99)
HCT VFR BLD CALC: 37.9 % — LOW (ref 39–50)
HGB BLD-MCNC: 13 G/DL — SIGNIFICANT CHANGE UP (ref 13–17)
MCHC RBC-ENTMCNC: 32.3 PG — SIGNIFICANT CHANGE UP (ref 27–34)
MCHC RBC-ENTMCNC: 34.3 G/DL — SIGNIFICANT CHANGE UP (ref 32–36)
MCV RBC AUTO: 94.3 FL — SIGNIFICANT CHANGE UP (ref 80–100)
NRBC # BLD AUTO: 0 K/UL — SIGNIFICANT CHANGE UP (ref 0–0)
NRBC # FLD: 0 K/UL — SIGNIFICANT CHANGE UP (ref 0–0)
NRBC BLD AUTO-RTO: 0 /100 WBCS — SIGNIFICANT CHANGE UP (ref 0–0)
PHOSPHATE SERPL-MCNC: 2.3 MG/DL — LOW (ref 2.5–4.5)
PLATELET # BLD AUTO: 137 K/UL — LOW (ref 150–400)
PMV BLD: 9.1 FL — SIGNIFICANT CHANGE UP (ref 7–13)
POTASSIUM SERPL-MCNC: 3.7 MMOL/L — SIGNIFICANT CHANGE UP (ref 3.5–5.3)
POTASSIUM SERPL-SCNC: 3.7 MMOL/L — SIGNIFICANT CHANGE UP (ref 3.5–5.3)
RBC # BLD: 4.02 M/UL — LOW (ref 4.2–5.8)
RBC # FLD: 11.7 % — SIGNIFICANT CHANGE UP (ref 10.3–14.5)
SODIUM SERPL-SCNC: 143 MMOL/L — SIGNIFICANT CHANGE UP (ref 135–145)
WBC # BLD: 7.39 K/UL — SIGNIFICANT CHANGE UP (ref 3.8–10.5)
WBC # FLD AUTO: 7.39 K/UL — SIGNIFICANT CHANGE UP (ref 3.8–10.5)

## 2025-06-28 PROCEDURE — 84145 PROCALCITONIN (PCT): CPT

## 2025-06-28 PROCEDURE — 82570 ASSAY OF URINE CREATININE: CPT

## 2025-06-28 PROCEDURE — 36415 COLL VENOUS BLD VENIPUNCTURE: CPT

## 2025-06-28 PROCEDURE — 84540 ASSAY OF URINE/UREA-N: CPT

## 2025-06-28 PROCEDURE — 83605 ASSAY OF LACTIC ACID: CPT

## 2025-06-28 PROCEDURE — 84300 ASSAY OF URINE SODIUM: CPT

## 2025-06-28 PROCEDURE — 80048 BASIC METABOLIC PNL TOTAL CA: CPT

## 2025-06-28 PROCEDURE — 85027 COMPLETE CBC AUTOMATED: CPT

## 2025-06-28 PROCEDURE — 82550 ASSAY OF CK (CPK): CPT

## 2025-06-28 PROCEDURE — 74177 CT ABD & PELVIS W/CONTRAST: CPT

## 2025-06-28 PROCEDURE — 83735 ASSAY OF MAGNESIUM: CPT

## 2025-06-28 PROCEDURE — 85025 COMPLETE CBC W/AUTO DIFF WBC: CPT

## 2025-06-28 PROCEDURE — 87040 BLOOD CULTURE FOR BACTERIA: CPT

## 2025-06-28 PROCEDURE — 81001 URINALYSIS AUTO W/SCOPE: CPT

## 2025-06-28 PROCEDURE — 96375 TX/PRO/DX INJ NEW DRUG ADDON: CPT

## 2025-06-28 PROCEDURE — 83690 ASSAY OF LIPASE: CPT

## 2025-06-28 PROCEDURE — 74176 CT ABD & PELVIS W/O CONTRAST: CPT

## 2025-06-28 PROCEDURE — 99285 EMERGENCY DEPT VISIT HI MDM: CPT | Mod: 25

## 2025-06-28 PROCEDURE — 71045 X-RAY EXAM CHEST 1 VIEW: CPT

## 2025-06-28 PROCEDURE — 80307 DRUG TEST PRSMV CHEM ANLYZR: CPT

## 2025-06-28 PROCEDURE — 96374 THER/PROPH/DIAG INJ IV PUSH: CPT

## 2025-06-28 PROCEDURE — 84100 ASSAY OF PHOSPHORUS: CPT

## 2025-06-28 PROCEDURE — 80053 COMPREHEN METABOLIC PANEL: CPT

## 2025-06-28 PROCEDURE — 93005 ELECTROCARDIOGRAM TRACING: CPT

## 2025-06-28 PROCEDURE — 82607 VITAMIN B-12: CPT

## 2025-06-28 PROCEDURE — 84156 ASSAY OF PROTEIN URINE: CPT

## 2025-06-28 RX ORDER — ACETAMINOPHEN 500 MG/5ML
2 LIQUID (ML) ORAL
Qty: 0 | Refills: 0 | DISCHARGE
Start: 2025-06-28

## 2025-06-28 RX ORDER — LACTOBACILLUS ACIDOPHILUS/PECT 75 MM-100
1 CAPSULE ORAL
Qty: 0 | Refills: 0 | DISCHARGE
Start: 2025-06-28

## 2025-06-28 RX ORDER — SOD PHOS DI, MONO/K PHOS MONO 250 MG
1 TABLET ORAL ONCE
Refills: 0 | Status: COMPLETED | OUTPATIENT
Start: 2025-06-28 | End: 2025-06-28

## 2025-06-28 RX ORDER — DIAZEPAM 5 MG/1
1 TABLET ORAL
Qty: 0 | Refills: 0 | DISCHARGE

## 2025-06-28 RX ORDER — SODIUM PHOSPHATE,DIBASIC DIHYD
15 POWDER (GRAM) MISCELLANEOUS ONCE
Refills: 0 | Status: DISCONTINUED | OUTPATIENT
Start: 2025-06-28 | End: 2025-06-28

## 2025-06-28 RX ADMIN — Medication 1 PACKET(S): at 14:47

## 2025-06-28 RX ADMIN — Medication 25 GRAM(S): at 05:34

## 2025-06-28 RX ADMIN — Medication 1 TABLET(S): at 05:34

## 2025-06-28 RX ADMIN — Medication 40 MILLIGRAM(S): at 11:37

## 2025-07-01 LAB
CULTURE RESULTS: SIGNIFICANT CHANGE UP
CULTURE RESULTS: SIGNIFICANT CHANGE UP
SPECIMEN SOURCE: SIGNIFICANT CHANGE UP
SPECIMEN SOURCE: SIGNIFICANT CHANGE UP